# Patient Record
Sex: FEMALE | Race: WHITE | Employment: OTHER | URBAN - METROPOLITAN AREA
[De-identification: names, ages, dates, MRNs, and addresses within clinical notes are randomized per-mention and may not be internally consistent; named-entity substitution may affect disease eponyms.]

---

## 2017-01-05 ENCOUNTER — OFFICE VISIT (OUTPATIENT)
Dept: DERMATOLOGY | Facility: AMBULATORY SURGERY CENTER | Age: 82
End: 2017-01-05

## 2017-01-05 VITALS
OXYGEN SATURATION: 96 % | SYSTOLIC BLOOD PRESSURE: 134 MMHG | HEIGHT: 60 IN | HEART RATE: 64 BPM | TEMPERATURE: 97.8 F | RESPIRATION RATE: 16 BRPM | DIASTOLIC BLOOD PRESSURE: 82 MMHG

## 2017-01-05 DIAGNOSIS — C44.212 BASAL CELL CARCINOMA OF POSTAURICULAR REGION, RIGHT: ICD-10-CM

## 2017-01-05 DIAGNOSIS — C44.311 BASAL CELL CARCINOMA OF RIGHT NASAL TIP: Primary | ICD-10-CM

## 2017-01-05 DIAGNOSIS — C44.311 BASAL CELL CARCINOMA OF RIGHT ALA NASI: ICD-10-CM

## 2017-01-05 RX ORDER — LIDOCAINE HYDROCHLORIDE AND EPINEPHRINE 10; 10 MG/ML; UG/ML
3 INJECTION, SOLUTION INFILTRATION; PERINEURAL ONCE
Qty: 1 VIAL | Refills: 0
Start: 2017-01-05 | End: 2017-01-05

## 2017-01-05 RX ORDER — GABAPENTIN 300 MG/1
CAPSULE ORAL
Refills: 5 | COMMUNITY
Start: 2016-11-04 | End: 2018-02-23

## 2017-01-05 RX ORDER — BUPIVACAINE HYDROCHLORIDE AND EPINEPHRINE 2.5; 5 MG/ML; UG/ML
INJECTION, SOLUTION INFILTRATION; PERINEURAL
Qty: 1.5 ML | Refills: 0
Start: 2017-01-05 | End: 2018-02-23

## 2017-01-05 RX ORDER — FUROSEMIDE 20 MG/1
TABLET ORAL
Refills: 5 | COMMUNITY
Start: 2016-12-26

## 2017-01-05 NOTE — PATIENT INSTRUCTIONS
WOUND CARE INSTRUCTIONS    1. Keep the dressing clean and dry and do not remove for 48 hours. 2. Then change the dressing once a day as follows:  a. Wash hands before and after each dressing change. b. Remove dressing and wash site gently with mild soap and water, rinse, and pat dry.  c. Apply an ointment (Bacitracin, Polysporin, Neosporin, Petroleum jelly or Aquaphor). d. Apply a non-stick (Telfa) dressing or Band-Aid to cover the wound. Remove pressure bandage Saturday, then wash gently and apply Vaseline and a band-aid to site daily for 1 week. 3. Watch for:  BLEEDING: A small amount of drainage may occur. If bleeding occurs, elevate and rest the surgery site. Apply gauze and steady pressure for 15 minutes. If bleeding continues, call this office. INFECTION: Signs of infection include increased redness, pain, warmth, drainage of pus, and fever. If this occurs, call this office. 4. Special Instructions (follow any that are checked):  · [] You have stitches that need to be removed in 0 days  · [x] Avoid bending at the waist and heavy lifting for two days. · [x] Sleep with your head elevated for the next two nights. · [x] Rest the surgery site and keep it elevated as much as possible for two days. · [x] You may apply an ice-pack for 10-15 minutes every waking hour for the rest of the day. · [] Eat a soft diet and avoid hot food and hot drinks for the rest of the day. · [] Other instructions: Follow up as needed  Take Tylenol for pain as needed. Once the site is healed with no remaining bandages or open areas, protect your surgical site and scar from the sun, as this area will be more sensitive. Use a broad spectrum sunscreen SPF 30 or higher daily, and a chemical free product (one containing zinc oxide or titanium dioxide) is a good choice if the area is sensitive. You may begin to gently massage the surgical site in 2-3 weeks, rubbing in a circular motion along the scar.  This can help reduce swelling and thickness of a scar. A scar cream may be used beginnning 1 month after the surgery. If you have any questions or concerns, please call our office Monday through Friday at [phone].

## 2017-01-05 NOTE — PROGRESS NOTES
This note is written by Rosie Cooper, as dictated by Sj Díaz. Joao Stein MD.    CC: Basal cell carcinomas on the right ala, right nasal supratip, and right postauricular    History of present illness:     Aruna Mclean is a 80 y.o. female referred by Darren Lucas DNP. She has three biopsy-proven basal cell carcinomas on the right ala, right nasal supratrip, and right postauricular. The lesions on her right ala and right postauricular are new basal cell carcinomas present for a few months, no prior treatment. The lesion behind her ear is described as a persistent bump that waxes and wanes in size and itches. The lesion on her nasal supratip is recurrent from surgical treatment and was treated with cryotherapy a few months ago. Biopsies confirmed the diagnoses of basal cell carcinoma for all three lesions, and I reviewed the written pathology. She also has an actinic keratosis on her nasal tip. She is feeling well and in her usual state of health today. She has no pain, no current illnesses, no other skin concerns. Her allergies, medications, medical, and social history are reviewed by me today. Exam:     She is an awake, alert, and oriented 80 y.o. female who appears well and in no distress. There is no preauricular, submandibular, or cervical lymphadenopathy. I examined her face and neck. She has a 3 x 2 mm pearly papule on her right ala, an 8 x 6 mm shiny papule and scar on her right nasal supratip with thin AK near the tip, and a 6 x 4 mm pearly papule on her right postauricular. Locations confirmed by patient and biopsy photos. Assessment/plan:    1. Basal cell carcinoma, right ala. I discussed the diagnosis of basal cell carcinoma and summarized the pathology report. Mohs surgery is indicated by site. The procedure was discussed, written and verbal consent were obtained. I performed the procedure. One stage was required to reach a tumor free plane.  The surgical defect was managed with second intent healing. There were no complications. She will follow up as needed as the site heals. Indications, risks, and options were discussed with Linh Castellanos preoperatively. Risks including, but not limited to: pain, bleeding, infection, tumor recurrence, scarring and damage to motor and/or sensory nerves, were discussed. Linh Castellanos chose Mohs surgery. Linh Castellanos was an acceptable surgery candidate. Linh Castellanos was placed in the appropriate position on the operating table in the Mohs surgery procedure room. The area was prepped and draped in the standard manner. Gentian violet was used to outline the clinical margins of the tumor. Local anesthesia was then obtained. The grossly visible tumor was then removed, an underlying layer was excised and mapped according to the Mohs technique, and the individual specimens examined microscopically. The process was repeated until microscopic examination of the tissue specimens confirmed a tumor-free plane. Hemostasis was obtained with electrosurgery and pressure. The wound was covered between stages with moist saline gauze. Wound care instructions (written and verbal) and a follow up appointment were given to Linh Castellanos before discharge. Linh Castellanos was discharged in good condition. 2.  Basal cell carcinoma, right nasal supratip. I discussed the diagnosis of basal cell carcinoma and summarized the pathology report. Mohs surgery is indicated by site, size and recurrence. The procedure was discussed, written and verbal consent were obtained. I performed the procedure. One stage was required to reach a tumor free plane. The surgical defect was managed with complex repair which included excision of the clinical extent of the AK as well. There were no complications. She will follow up as needed as the site heals. Indications, risks, and options were discussed with Linh Castellanos preoperatively.  Risks including, but not limited to: pain, bleeding, infection, tumor recurrence, scarring and damage to motor and/or sensory nerves, were discussed. Jazzmine Nuno chose Mohs surgery. Jazzmine Nuno was an acceptable surgery candidate. Jazzmine Nuno was placed in the appropriate position on the operating table in the Mohs surgery procedure room. The area was prepped and draped in the standard manner. Gentian violet was used to outline the clinical margins of the tumor. Local anesthesia was then obtained. The grossly visible tumor was then removed, an underlying layer was excised and mapped according to the Mohs technique, and the individual specimens examined microscopically. The process was repeated until microscopic examination of the tissue specimens confirmed a tumor-free plane. Hemostasis was obtained with electrosurgery and pressure. The wound was covered between stages with moist saline gauze. Wound care instructions (written and verbal) and a follow up appointment were given to Jazzmine Palmertomasa before discharge. Jazzmine Nuno was discharged in good condition. The wound management options of second intent healing, layered closure, local flap, and/or full thickness skin graft were discussed. Jazzmine Nuno understands the aims, risks, alternatives, and possible complications and elects to proceed with a complex layered closure. Wound margins were made vertical, edges undermined in the muscular plane, standing cones corrected at both poles followed by layered closure. The wound was closed with buried 6-0 polysorb suture in the muscle and deep subcutis to reduce width of the wound, a second layer in the dermis to reduce tension on the skin edges, and skin edges were approximated with 6-0 fast gut suture. The final closure length was 32 mm. The wound was bandaged with Vaseline, Telfa, gauze and Coverroll. Wound care instructions (written and verbal) and a follow up appointment were given to Jazzmine Palmertomasa before discharge. Mevelyn Lipoma was discharged in good condition. 3. Basal cell carcinoma, right postauricular. Curettage was advised to address this lesion with malignant destruction. The procedure was reviewed and verbal and written consent were obtained. The risks of pain, bleeding, infection, scar, and recurrence of the lesion were discussed. I performed the procedure. The site was cleansed and anesthetized with 1% Lidocaine with Epinephrine 1:100,000. Curettage was performed by me to include a 2 mm margin, resulting in a post curettage defect size of 10 x 8 mm. Drysol was used for hemostasis. The wound was bandaged and care reviewed. The specimen was sent to pathology. I will contact the patient with the results and any further treatment that may be necessary. 4. History of nonmelanoma skin cancer. I discussed the diagnosis and recommend routine examinations with Jen Young DNP, for surveillance. The documentation recorded by the scribe accurately reflects the service I personally performed and the decisions made by me. [facility]   OFFICE PROCEDURE PROGRESS NOTE     Chart reviewed for the following:     Tammy Leon MD, have reviewed the History, Physical and updated the Allergic reactions for [address] performed immediately prior to start of procedure:     Tammy Leon MD, have performed the following reviews on Mevelyn Lipoma prior to the start of the procedure:     * Patient was identified by name and date of birth   * Agreement on procedure being performed was verified   * Risks and Benefits explained to the patient   * Procedure site verified and marked as necessary   * Patient was positioned for comfort   * Consent was signed and verified     Time: 10:00 AM  Date of procedure: 1/5/2017  Procedure performed by: Hedy Marc.  Nicole Leon MD   Provider assisted by: LPN   Patient assisted by: self   How tolerated by patient: tolerated the procedure well with no complications   Comments: none

## 2017-01-05 NOTE — PROGRESS NOTES
Pre-op: Patient present today for the evaluation of basal cell carcinoma to the right nasal supratip and right ala. Procedure explained with full understanding. Vitals:    01/05/17 0949   BP: 134/82   Pulse: 64   Resp: 16   Temp: 97.8 °F (36.6 °C)   TempSrc: Oral   SpO2: 96%   Height: 5' (1.524 m)     preoperatively, will continue to monitor. Post-op: Written and verbal post-op wound care instructions given to patient with full understanding of care. Surgical wound bandaged with Vaseline, Telfa, 2x2 gauze, and coverall tape. All questions and concerns addressed. Vitals stable postoperatively.

## 2017-01-05 NOTE — MR AVS SNAPSHOT
Visit Information Date & Time Provider Department Dept. Phone Encounter #  
 1/5/2017 10:00 AM MD Mando Avery [id number] [phone] [id number] Upcoming Health Maintenance Date Due  
 LIPID PANEL Q1 11/5/1928 FOOT EXAM Q1 11/5/1938 MICROALBUMIN Q1 11/5/1938 EYE EXAM RETINAL OR DILATED Q1 11/5/1938 ZOSTER VACCINE AGE 60> 11/5/1988 GLAUCOMA SCREENING Q2Y 11/5/1993 MEDICARE YEARLY EXAM 11/5/1993 DTaP/Tdap/Td series (1 - Tdap) 3/16/2012 HEMOGLOBIN A1C Q6M 5/13/2014 INFLUENZA AGE 9 TO ADULT 8/1/2016 Pneumococcal 65+ Low/Medium Risk (2 of 2 - PCV13) 9/22/2016 Allergies as of 1/5/2017  Review Complete On: 1/5/2017 By: Naida GRANADOS Rash, LPN Severity Noted Reaction Type Reactions Latex  11/13/2013    Itching Pcn [Penicillins] High 04/27/2010    Anaphylaxis Adhesive Tape-silicones  09/63/1265    Itching, Other (comments) REDNESS Codeine  04/27/2010    Nausea and Vomiting Current Immunizations  Reviewed on 9/21/2015 Name Date Pneumococcal Polysaccharide (PPSV-23) 9/22/2015 10:48 AM  
 TD Vaccine 3/15/2012 10:41 PM  
  
 Not reviewed this visit Vitals BP Pulse Temp Resp Height(growth percentile) SpO2  
 134/82 (BP 1 Location: Right arm, BP Patient Position: Sitting) 64 97.8 °F (36.6 °C) (Oral) 16 5' (1.524 m) 96% OB Status Smoking Status Postmenopausal Former Smoker Preferred Pharmacy Pharmacy Name Phone [facility] [address] [place] [address], [address] [place] [address] [phone] Your Updated Medication List  
  
   
This list is accurate as of: 1/5/17 10:49 AM.  Always use your most recent med list.  
  
  
  
  
 bumetanide 0.5 mg tablet Commonly known as:  Merla Goods Take 0.5 mg by mouth daily. furosemide 20 mg tablet Commonly known as:  LASIX TK 1 T PO QD  
  
 * gabapentin 100 mg capsule Commonly known as:  NEURONTIN Take 100 mg by mouth nightly. * gabapentin 300 mg capsule Commonly known as:  NEURONTIN  
TK ONE C PO  ONCE A DAY HYDROcodone-acetaminophen 5-325 mg per tablet Commonly known as:  [place] Cuff Take 1 Tab by mouth every four (4) hours as needed for Pain. Max Daily Amount: 6 Tabs. metoprolol tartrate 25 mg tablet Commonly known as:  LOPRESSOR Take 12.5 mg by mouth two (2) times a day. nystatin-triamcinolone topical cream  
Commonly known as:  MYCOLOG II Apply  to affected area as needed. (sacral wound) omeprazole 20 mg capsule Commonly known as:  PRILOSEC Take 20 mg by mouth nightly. 9 PM  
  
 ondansetron 4 mg disintegrating tablet Commonly known as:  ZOFRAN ODT Take 1 Tab by mouth every eight (8) hours as needed for Nausea. traMADol 50 mg tablet Commonly known as:  ULTRAM  
Take 50 mg by mouth every six (6) hours as needed for Pain. traZODone 50 mg tablet Commonly known as:  Emaline Sober Take 50 mg by mouth nightly. VITAMIN B-12 1,000 mcg tablet Generic drug:  cyanocobalamin Take 1,000 mcg by mouth daily. * Notice: This list has 2 medication(s) that are the same as other medications prescribed for you. Read the directions carefully, and ask your doctor or other care provider to review them with you. Patient Instructions WOUND CARE INSTRUCTIONS 1. Keep the dressing clean and dry and do not remove for 48 hours. 2. Then change the dressing once a day as follows: 
a. Wash hands before and after each dressing change. b. Remove dressing and wash site gently with mild soap and water, rinse, and pat dry. 
c. Apply an ointment (Bacitracin, Polysporin, Neosporin, Petroleum jelly or Aquaphor). d. Apply a non-stick (Telfa) dressing or Band-Aid to cover the wound. Remove pressure bandage Saturday, then wash gently and apply Vaseline and a band-aid to site daily for 1 week. 3. Watch for: BLEEDING: A small amount of drainage may occur. If bleeding occurs, elevate and rest the surgery site. Apply gauze and steady pressure for 15 minutes. If bleeding continues, call this office. INFECTION: Signs of infection include increased redness, pain, warmth, drainage of pus, and fever. If this occurs, call this office. 4. Special Instructions (follow any that are checked): ·  You have stitches that need to be removed in 0 days ·  Avoid bending at the waist and heavy lifting for two days. ·  Sleep with your head elevated for the next two nights. ·  Rest the surgery site and keep it elevated as much as possible for two days. ·  You may apply an ice-pack for 10-15 minutes every waking hour for the rest of the day. ·  Eat a soft diet and avoid hot food and hot drinks for the rest of the day. ·  Other instructions: Follow up as needed Take Tylenol for pain as needed. Once the site is healed with no remaining bandages or open areas, protect your surgical site and scar from the sun, as this area will be more sensitive. Use a broad spectrum sunscreen SPF 30 or higher daily, and a chemical free product (one containing zinc oxide or titanium dioxide) is a good choice if the area is sensitive. You may begin to gently massage the surgical site in 2-3 weeks, rubbing in a circular motion along the scar. This can help reduce swelling and thickness of a scar. A scar cream may be used beginnning 1 month after the surgery. If you have any questions or concerns, please call our office Monday through Friday at [phone]. Introducing [facility]! [facility] introduces [employer] patient portal. Now you can access parts of your medical record, email your doctor's office, and request medication refills online. 1. In your internet browser, go to [email or web address] 2. Click on the First Time User? Click Here link in the Sign In box.  You will see the New Member Sign Up page. 3. Enter your [employer] Access Code exactly as it appears below. You will not need to use this code after youve completed the sign-up process. If you do not sign up before the expiration date, you must request a new code. · [employer] Access Code: [email or web address] Expires: 3/14/2017  1:32 PM 
 
4. Enter the last four digits of your Social Security Number (xxxx) and Date of Birth (mm/dd/yyyy) as indicated and click Submit. You will be taken to the next sign-up page. 5. Create a [employer] ID. This will be your [employer] login ID and cannot be changed, so think of one that is secure and easy to remember. 6. Create a [employer] password. You can change your password at any time. 7. Enter your Password Reset Question and Answer. This can be used at a later time if you forget your password. 8. Enter your e-mail address. You will receive e-mail notification when new information is available in [address]. 9. Click Sign Up. You can now view and download portions of your medical record. 10. Click the Download Summary menu link to download a portable copy of your medical information. If you have questions, please visit the Frequently Asked Questions section of the [employer] website. Remember, [employer] is NOT to be used for urgent needs. For medical emergencies, dial 911. Now available from your iPhone and Android! Please provide this summary of care documentation to your next provider. Your primary care clinician is listed as Meka Mcneill. If you have any questions after today's visit, please call [phone].

## 2017-02-21 ENCOUNTER — OFFICE VISIT (OUTPATIENT)
Dept: SURGERY | Age: 82
End: 2017-02-21

## 2017-02-21 VITALS
BODY MASS INDEX: 27.68 KG/M2 | RESPIRATION RATE: 20 BRPM | TEMPERATURE: 98.6 F | HEART RATE: 67 BPM | SYSTOLIC BLOOD PRESSURE: 160 MMHG | DIASTOLIC BLOOD PRESSURE: 96 MMHG | WEIGHT: 141 LBS | OXYGEN SATURATION: 94 % | HEIGHT: 60 IN

## 2017-02-21 DIAGNOSIS — K21.9 GASTROESOPHAGEAL REFLUX DISEASE WITHOUT ESOPHAGITIS: ICD-10-CM

## 2017-02-21 DIAGNOSIS — R10.33 PERIUMBILICAL ABDOMINAL PAIN: Primary | ICD-10-CM

## 2017-02-21 NOTE — PROGRESS NOTES
1. Have you been to the ER, urgent care clinic since your last visit? Hospitalized since your last visit? No    2. Have you seen or consulted any other health care providers outside of the [address] [place] [address] since your last visit? Include any pap smears or colon screening.  No

## 2017-02-22 NOTE — PROGRESS NOTES
Subjective:      Isauro Thompson is a 80 y.o. female presents for postop care 4 months following umbilical hernia repair. Appetite is fair. Eating a regular diet with recurrent GERD symptoms after prior paraesophageal hernia repair. Bowel movements are regular. The patient is voiding without difficulty. She complains of umbilical pain with recurrent intermittent bulge. Objective:     Visit Vitals    BP (!) 160/96 (BP 1 Location: Left arm, BP Patient Position: Sitting)    Pulse 67    Temp 98.6 °F (37 °C)    Resp 20    Ht 5' (1.524 m)    Wt 141 lb (64 kg)    SpO2 94%    BMI 27.54 kg/m2       General:  alert, cooperative, no distress, appears stated age   Abdomen: soft, pain with palpation to umbilical area without obvious bulge   Incision:   well healed     Assessment:     Umbilical pain, subjective intermittent bulge. Recurrent GERD symptoms    Plan:     1. Continue current medications. 2. Abdominal wall ultrasound to assess for recurrent ventral or inguinal hernia. 3. UGI to assess for recurrent paraesophageal hernia.

## 2017-02-22 NOTE — PATIENT INSTRUCTIONS

## 2017-03-06 ENCOUNTER — HOSPITAL ENCOUNTER (OUTPATIENT)
Dept: ULTRASOUND IMAGING | Age: 82
Discharge: HOME | End: 2017-03-06
Attending: SURGERY
Payer: MEDICARE

## 2017-03-06 ENCOUNTER — HOSPITAL ENCOUNTER (OUTPATIENT)
Dept: GENERAL RADIOLOGY | Age: 82
Discharge: HOME | End: 2017-03-06
Attending: SURGERY
Payer: MEDICARE

## 2017-03-06 DIAGNOSIS — R10.33 PERIUMBILICAL ABDOMINAL PAIN: ICD-10-CM

## 2017-03-06 DIAGNOSIS — K21.9 GASTROESOPHAGEAL REFLUX DISEASE WITHOUT ESOPHAGITIS: ICD-10-CM

## 2017-03-06 PROCEDURE — 76705 ECHO EXAM OF ABDOMEN: CPT

## 2017-03-06 PROCEDURE — 74241 XR UPPER GI SERIES W KUB: CPT

## 2017-06-07 ENCOUNTER — OFFICE VISIT (OUTPATIENT)
Dept: DERMATOLOGY | Facility: AMBULATORY SURGERY CENTER | Age: 82
End: 2017-06-07

## 2017-06-07 VITALS
OXYGEN SATURATION: 97 % | TEMPERATURE: 98.2 F | WEIGHT: 141 LBS | HEIGHT: 60 IN | DIASTOLIC BLOOD PRESSURE: 74 MMHG | BODY MASS INDEX: 27.68 KG/M2 | SYSTOLIC BLOOD PRESSURE: 122 MMHG | HEART RATE: 72 BPM

## 2017-06-07 DIAGNOSIS — Z85.828 HISTORY OF SKIN CANCER: ICD-10-CM

## 2017-06-07 DIAGNOSIS — J34.89 LESION OF NOSE: Primary | ICD-10-CM

## 2017-06-07 NOTE — PROGRESS NOTES
Written by Brianna Metzger, as dictated by Brandyn Pollard, [address]. Name: Marta Aguilar       Age: 80 y.o. Date: 6/7/2017    Chief Complaint:   Chief Complaint   Patient presents with    Other     spots on the nose       Subjective:    HPI:  Ms.. Marta Aguilar is a 80 y.o. female who presents for the evaluation of a lesion on the left nasal sidewall. She states that the lesion appeared many months ago. The patient has not had prior treatment for this lesion. Associated symptoms include persistent swollen area without skin change. ROS: Consitutional: Negative  Dermatological : positive for - skin lesion changes      Social History     Social History    Marital status:      Spouse name: N/A    Number of children: N/A    Years of education: N/A     Occupational History    Not on file.      Social History Main Topics    Smoking status: Former Smoker     Packs/day: 0.25     Years: 45.00     Quit date: 11/13/1993    Smokeless tobacco: Never Used      Comment: 2-3 CIGARETTES PER DAY    Alcohol use Yes      Comment: RARELY    Drug use: No    Sexual activity: Not Currently     Other Topics Concern    Not on file     Social History Narrative       Family History   Problem Relation Age of Onset    Hypertension Mother     Diabetes Mother     Heart Failure Mother     Heart Disease Mother     Arthritis-osteo Mother     Stroke Father     Kidney Disease Father      DAMAGED KIDNEY AFTER A FALL    Arthritis-osteo Father     Cancer Sister      PANCREATIC AND LIVER    Anesth Problems Neg Hx        Past Medical History:   Diagnosis Date    Arthritis     Cancer ([address]     [address] ON F[address]    DDD (degenerative disc disease)     Diabetes mellitus type II 8/20/2010    NO MEDS CURRENTLY, PT DENIES (9/7/16)    Family history of skin cancer     GERD (gastroesophageal reflux disease) 4/27/2010    Heart murmur     History of blood transfusion [address], NO REACTION    Hyperlipidemia LDL goal < 100     Hyperlipidemia LDL goal < 100 4/27/2010    Hypertension     Impaired fasting glucose     Osteoporosis     Skin cancer     Numerous BCC on face and ears    Sun-damaged skin     Thyroid disease     NO MEDS CURRENTLY (9/7/16)       Past Surgical History:   Procedure Laterality Date    HX BACK SURGERY      SOME SURGERY ON LOWER BACK    HX BREAST BIOPSY      BENIGN, HAD FIBROCYST    HX CATARACT REMOVAL Bilateral     W/LENS IMPLANT    HX CHOLECYSTECTOMY      HX GI      ENDOSCOPY, STRETCHING OF ESOPHAGOUS    HX GI      COLONOSCOPY    HX HEART CATHETERIZATION      NO STENTS    HX HERNIA REPAIR  9/17/15    Laparoscopic paraesophageal hernia repair with mesh  by Dr Nitish Canela  95/34/9742    lap umbilical hernia repair-[facility]-Dr. DALLIN Abdi    HX KNEE REPLACEMENT Left 11/25/2013    HX LAP CHOLECYSTECTOMY      HX OTHER SURGICAL      BCCA ON FACE    HX UROLOGICAL      ? BLADDER TUCK    REMOVAL GALLBLADDER      TOTAL KNEE ARTHROPLASTY Right 1993       Current Outpatient Prescriptions   Medication Sig Dispense Refill    furosemide (LASIX) 20 mg tablet TK 1 T PO QD  5    gabapentin (NEURONTIN) 300 mg capsule TK ONE C PO  ONCE A DAY  5    cyanocobalamin (VITAMIN B-12) 1,000 mcg tablet Take 1,000 mcg by mouth daily.  bumetanide (BUMEX) 0.5 mg tablet Take 0.5 mg by mouth daily.  gabapentin (NEURONTIN) 100 mg capsule Take 100 mg by mouth nightly.  omeprazole (PRILOSEC) 20 mg capsule Take 20 mg by mouth nightly. 9 PM      traZODone (DESYREL) 50 mg tablet Take 50 mg by mouth nightly.  metoprolol (LOPRESSOR) 25 mg tablet Take 12.5 mg by mouth two (2) times a day.  nystatin-triamcinolone (MYCOLOG II) topical cream Apply  to affected area as needed. (sacral wound)       traMADol (ULTRAM) 50 mg tablet Take 50 mg by mouth every six (6) hours as needed for Pain.       bupivacaine-EPINEPHrine (MARCAINE-EPINEPHRINE) 0.25 %-1:200,000 soln Used for mohs surgery 1.5 mL 0    HYDROcodone-acetaminophen (NORCO) 5-325 mg per tablet Take 1 Tab by mouth every four (4) hours as needed for Pain. Max Daily Amount: 6 Tabs. 30 Tab 0    ondansetron (ZOFRAN ODT) 4 mg disintegrating tablet Take 1 Tab by mouth every eight (8) hours as needed for Nausea. 10 Tab 0       Allergies   Allergen Reactions    Latex Itching    Pcn [Penicillins] Anaphylaxis    Adhesive Tape-Silicones Itching and Other (comments)     REDNESS    Codeine Nausea and Vomiting         Objective:    Visit Vitals    /74 (BP 1 Location: Left arm, BP Patient Position: Sitting)    Pulse 72    Temp 98.2 °F (36.8 °C) (Oral)    Ht 5' (1.524 m)    Wt 141 lb (64 kg)    SpO2 97%    BMI 27.54 kg/m2       Galina Redd is a 80 y.o. female who appears well and in no distress. She is awake, alert, and oriented. A limited skin examination was completed including her face. There are well healed scars without evidence of lesion recurrence. There is a minimally elevated  mobile rubbery subcutaneous nodule on the left side of the nose without overlying skin change. This is just posterior / inferior to where her glasses rest.         Assessment/Plan:  1. Lesion left side of nose. I feel this is most likely fat and is accentuated by the place where her glasses rest. No skin change is appreciated on the surface for skin cancer. I suggest follow up with PCP and possible ENT evaluation if they see fit. 2.. Personal history of skin cancer. I discussed sun protection, sunscreen use, the warning signs of skin cancer, the need for self-skin examinations, and the need for regular practitioner exams every 1 year. The patient should follow up sooner as needed if new, changing, or symptomatic skin lesions arise. This plan was reviewed with the patient and patient agrees. All questions were answered.     This scribe documentation was reviewed by me and accurately reflects the examination and decisions made by me.

## 2018-02-23 ENCOUNTER — APPOINTMENT (OUTPATIENT)
Dept: ULTRASOUND IMAGING | Age: 83
End: 2018-02-23
Attending: EMERGENCY MEDICINE
Payer: MEDICARE

## 2018-02-23 ENCOUNTER — APPOINTMENT (OUTPATIENT)
Dept: CT IMAGING | Age: 83
End: 2018-02-23
Attending: EMERGENCY MEDICINE
Payer: MEDICARE

## 2018-02-23 ENCOUNTER — APPOINTMENT (OUTPATIENT)
Dept: GENERAL RADIOLOGY | Age: 83
End: 2018-02-23
Attending: EMERGENCY MEDICINE
Payer: MEDICARE

## 2018-02-23 ENCOUNTER — HOSPITAL ENCOUNTER (EMERGENCY)
Age: 83
Discharge: HOME | End: 2018-02-23
Attending: EMERGENCY MEDICINE
Payer: MEDICARE

## 2018-02-23 ENCOUNTER — TELEPHONE (OUTPATIENT)
Dept: SURGERY | Age: 83
End: 2018-02-23

## 2018-02-23 VITALS
BODY MASS INDEX: 26.5 KG/M2 | HEART RATE: 67 BPM | OXYGEN SATURATION: 94 % | WEIGHT: 135 LBS | HEIGHT: 60 IN | DIASTOLIC BLOOD PRESSURE: 85 MMHG | RESPIRATION RATE: 17 BRPM | SYSTOLIC BLOOD PRESSURE: 159 MMHG | TEMPERATURE: 98.6 F

## 2018-02-23 DIAGNOSIS — R11.2 NON-INTRACTABLE VOMITING WITH NAUSEA, UNSPECIFIED VOMITING TYPE: ICD-10-CM

## 2018-02-23 DIAGNOSIS — R10.13 ABDOMINAL PAIN, EPIGASTRIC: ICD-10-CM

## 2018-02-23 DIAGNOSIS — R79.89 ELEVATED LFTS: ICD-10-CM

## 2018-02-23 DIAGNOSIS — R19.7 DIARRHEA, UNSPECIFIED TYPE: ICD-10-CM

## 2018-02-23 DIAGNOSIS — K44.9 HIATAL HERNIA: Primary | ICD-10-CM

## 2018-02-23 LAB
ALBUMIN SERPL-MCNC: 3.7 G/DL (ref 3.5–5)
ALBUMIN/GLOB SERPL: 1.1 {RATIO} (ref 1.1–2.2)
ALP SERPL-CCNC: 215 U/L (ref 45–117)
ALT SERPL-CCNC: 137 U/L (ref 12–78)
ANION GAP SERPL CALC-SCNC: 6 MMOL/L (ref 5–15)
APPEARANCE UR: CLEAR
AST SERPL-CCNC: 80 U/L (ref 15–37)
BACTERIA URNS QL MICRO: NEGATIVE /HPF
BASOPHILS # BLD: 0 K/UL (ref 0–0.1)
BASOPHILS NFR BLD: 0 % (ref 0–1)
BILIRUB SERPL-MCNC: 0.5 MG/DL (ref 0.2–1)
BILIRUB UR QL: NEGATIVE
BUN SERPL-MCNC: 12 MG/DL (ref 6–20)
BUN/CREAT SERPL: 12 (ref 12–20)
CALCIUM SERPL-MCNC: 9.7 MG/DL (ref 8.5–10.1)
CHLORIDE SERPL-SCNC: 101 MMOL/L (ref 97–108)
CO2 SERPL-SCNC: 29 MMOL/L (ref 21–32)
COLOR UR: ABNORMAL
CREAT SERPL-MCNC: 1 MG/DL (ref 0.55–1.02)
DIFFERENTIAL METHOD BLD: NORMAL
EOSINOPHIL # BLD: 0.2 K/UL (ref 0–0.4)
EOSINOPHIL NFR BLD: 3 % (ref 0–7)
EPITH CASTS URNS QL MICRO: ABNORMAL /LPF
ERYTHROCYTE [DISTWIDTH] IN BLOOD BY AUTOMATED COUNT: 12.7 % (ref 11.5–14.5)
GLOBULIN SER CALC-MCNC: 3.3 G/DL (ref 2–4)
GLUCOSE SERPL-MCNC: 110 MG/DL (ref 65–100)
GLUCOSE UR STRIP.AUTO-MCNC: NEGATIVE MG/DL
HCT VFR BLD AUTO: 40.2 % (ref 35–47)
HGB BLD-MCNC: 13.8 G/DL (ref 11.5–16)
HGB UR QL STRIP: ABNORMAL
HYALINE CASTS URNS QL MICRO: ABNORMAL /LPF (ref 0–5)
IMM GRANULOCYTES # BLD: 0 K/UL (ref 0–0.04)
IMM GRANULOCYTES NFR BLD AUTO: 0 % (ref 0–0.5)
KETONES UR QL STRIP.AUTO: NEGATIVE MG/DL
LEUKOCYTE ESTERASE UR QL STRIP.AUTO: NEGATIVE
LIPASE SERPL-CCNC: 112 U/L (ref 73–393)
LYMPHOCYTES # BLD: 0.9 K/UL (ref 0.8–3.5)
LYMPHOCYTES NFR BLD: 17 % (ref 12–49)
MCH RBC QN AUTO: 31.4 PG (ref 26–34)
MCHC RBC AUTO-ENTMCNC: 34.3 G/DL (ref 30–36.5)
MCV RBC AUTO: 91.6 FL (ref 80–99)
MONOCYTES # BLD: 0.7 K/UL (ref 0–1)
MONOCYTES NFR BLD: 13 % (ref 5–13)
NEUTS SEG # BLD: 3.5 K/UL (ref 1.8–8)
NEUTS SEG NFR BLD: 66 % (ref 32–75)
NITRITE UR QL STRIP.AUTO: NEGATIVE
NRBC # BLD: 0 K/UL (ref 0–0.01)
NRBC BLD-RTO: 0 PER 100 WBC
PH UR STRIP: 6.5 [PH] (ref 5–8)
PLATELET # BLD AUTO: 151 K/UL (ref 150–400)
PMV BLD AUTO: 9.9 FL (ref 8.9–12.9)
POTASSIUM SERPL-SCNC: 4 MMOL/L (ref 3.5–5.1)
PROT SERPL-MCNC: 7 G/DL (ref 6.4–8.2)
PROT UR STRIP-MCNC: NEGATIVE MG/DL
RBC # BLD AUTO: 4.39 M/UL (ref 3.8–5.2)
RBC #/AREA URNS HPF: ABNORMAL /HPF (ref 0–5)
SODIUM SERPL-SCNC: 136 MMOL/L (ref 136–145)
SP GR UR REFRACTOMETRY: 1.01 (ref 1–1.03)
UA: UC IF INDICATED,UAUC: ABNORMAL
UROBILINOGEN UR QL STRIP.AUTO: 0.2 EU/DL (ref 0.2–1)
WBC # BLD AUTO: 5.4 K/UL (ref 3.6–11)
WBC URNS QL MICRO: ABNORMAL /HPF (ref 0–4)

## 2018-02-23 PROCEDURE — 74011636320 HC RX REV CODE- 636/320: Performed by: EMERGENCY MEDICINE

## 2018-02-23 PROCEDURE — 85025 COMPLETE CBC W/AUTO DIFF WBC: CPT | Performed by: EMERGENCY MEDICINE

## 2018-02-23 PROCEDURE — 74011250636 HC RX REV CODE- 250/636: Performed by: EMERGENCY MEDICINE

## 2018-02-23 PROCEDURE — 74177 CT ABD & PELVIS W/CONTRAST: CPT

## 2018-02-23 PROCEDURE — 80053 COMPREHEN METABOLIC PANEL: CPT | Performed by: EMERGENCY MEDICINE

## 2018-02-23 PROCEDURE — 76705 ECHO EXAM OF ABDOMEN: CPT

## 2018-02-23 PROCEDURE — 99285 EMERGENCY DEPT VISIT HI MDM: CPT

## 2018-02-23 PROCEDURE — 96361 HYDRATE IV INFUSION ADD-ON: CPT

## 2018-02-23 PROCEDURE — 81001 URINALYSIS AUTO W/SCOPE: CPT | Performed by: EMERGENCY MEDICINE

## 2018-02-23 PROCEDURE — 83690 ASSAY OF LIPASE: CPT | Performed by: EMERGENCY MEDICINE

## 2018-02-23 PROCEDURE — 74011000258 HC RX REV CODE- 258: Performed by: EMERGENCY MEDICINE

## 2018-02-23 PROCEDURE — 96360 HYDRATION IV INFUSION INIT: CPT

## 2018-02-23 PROCEDURE — 74241 XR UPPER GI SERIES W KUB: CPT

## 2018-02-23 RX ORDER — SODIUM CHLORIDE 0.9 % (FLUSH) 0.9 %
10 SYRINGE (ML) INJECTION
Status: COMPLETED | OUTPATIENT
Start: 2018-02-23 | End: 2018-02-23

## 2018-02-23 RX ORDER — IBUPROFEN 200 MG
200 TABLET ORAL DAILY
COMMUNITY
End: 2018-03-06

## 2018-02-23 RX ORDER — ACETAMINOPHEN 500 MG
1000 TABLET ORAL
COMMUNITY

## 2018-02-23 RX ORDER — SODIUM CHLORIDE 9 MG/ML
100 INJECTION, SOLUTION INTRAVENOUS CONTINUOUS
Status: DISCONTINUED | OUTPATIENT
Start: 2018-02-23 | End: 2018-02-23 | Stop reason: HOSPADM

## 2018-02-23 RX ADMIN — SODIUM CHLORIDE 100 ML: 900 INJECTION, SOLUTION INTRAVENOUS at 13:31

## 2018-02-23 RX ADMIN — IOPAMIDOL 100 ML: 755 INJECTION, SOLUTION INTRAVENOUS at 13:31

## 2018-02-23 RX ADMIN — SODIUM CHLORIDE 500 ML: 900 INJECTION, SOLUTION INTRAVENOUS at 11:24

## 2018-02-23 RX ADMIN — SODIUM CHLORIDE 500 ML: 900 INJECTION, SOLUTION INTRAVENOUS at 11:26

## 2018-02-23 RX ADMIN — Medication 10 ML: at 13:31

## 2018-02-23 RX ADMIN — SODIUM CHLORIDE 100 ML/HR: 900 INJECTION, SOLUTION INTRAVENOUS at 12:00

## 2018-02-23 NOTE — ED TRIAGE NOTES
TRIAGE NOTE: Patient arrives via EMS with diffuse abdominal pain since Tuesday. +n/v/d. Worried about strictures from hernia? Patient with 1 episode of vomiting Tuesday and 3 episodes of diarrhea since Thursday.

## 2018-02-23 NOTE — CONSULTS
Surgery Consult    Subjective:      Surgical consultation was called on Jacob Ha who is a 80 y.o. female who complaints of abdominal pain, vomiting and diarrhea  She has had an umbilical hernia repair 99/5651 and a paraesophageal hernia repair 9/2015 by Dr. Janus Jeans. She states that she vomited 3 days ago and developed mild epigastric pain at the site of her hernia. She has been having diarrhea for the past 3 days 2-3 episodes a day. She has taken 6 tablets of imodium without relief. She took Advil this morning for pain and had some relief. She takes 2 Tylenol daily. Denies blood in her stool, fever or chills.            Past Medical History:   Diagnosis Date    Arthritis     Cancer ([facility] [address]     [facility] ON FACE    DDD (degenerative disc disease)     Diabetes mellitus type II 8/20/2010    NO MEDS CURRENTLY, PT DENIES (9/7/16)    Family history of skin cancer     GERD (gastroesophageal reflux disease) 4/27/2010    Heart murmur     History of blood transfusion [address], NO REACTION    Hyperlipidemia LDL goal < 100     Hyperlipidemia LDL goal < 100 4/27/2010    Hypertension     Impaired fasting glucose     Osteoporosis     Skin cancer     Numerous BCC on face and ears    Sun-damaged skin     Thyroid disease     NO MEDS CURRENTLY (9/7/16)     Past Surgical History:   Procedure Laterality Date    HX BACK SURGERY      SOME SURGERY ON LOWER BACK    HX BREAST BIOPSY      BENIGN, HAD FIBROCYST    HX CATARACT REMOVAL Bilateral     W/LENS IMPLANT    HX CHOLECYSTECTOMY      HX GI      ENDOSCOPY, STRETCHING OF ESOPHAGOUS    HX GI      COLONOSCOPY    HX HEART CATHETERIZATION      NO STENTS    HX HERNIA REPAIR  9/17/15    Laparoscopic paraesophageal hernia repair with mesh  by Dr Domonique Lin  38/86/6268    lap umbilical hernia repair-[facility]-Dr. Jessica Lazcano    HX KNEE REPLACEMENT Left 11/25/2013    HX LAP CHOLECYSTECTOMY      HX OTHER SURGICAL      BCCA ON FACE    HX UROLOGICAL      ? BLADDER TUCK    REMOVAL GALLBLADDER      TOTAL KNEE ARTHROPLASTY Right 1993      Social History     Social History    Marital status:      Spouse name: N/A    Number of children: N/A    Years of education: N/A     Social History Main Topics    Smoking status: Former Smoker     Packs/day: 0.25     Years: 45.00     Quit date: 11/13/1993    Smokeless tobacco: Never Used      Comment: 2-3 CIGARETTES PER DAY    Alcohol use Yes      Comment: RARELY    Drug use: No    Sexual activity: Not Currently     Other Topics Concern    None     Social History Narrative      Current Facility-Administered Medications   Medication Dose Route Frequency    0.9% sodium chloride infusion  100 mL/hr IntraVENous CONTINUOUS     Current Outpatient Prescriptions   Medication Sig    furosemide (LASIX) 20 mg tablet TK 1 T PO QD    gabapentin (NEURONTIN) 300 mg capsule TK ONE C PO  ONCE A DAY    bupivacaine-EPINEPHrine (MARCAINE-EPINEPHRINE) 0.25 %-1:200,000 soln Used for mohs surgery    HYDROcodone-acetaminophen (NORCO) 5-325 mg per tablet Take 1 Tab by mouth every four (4) hours as needed for Pain. Max Daily Amount: 6 Tabs.  ondansetron (ZOFRAN ODT) 4 mg disintegrating tablet Take 1 Tab by mouth every eight (8) hours as needed for Nausea.  cyanocobalamin (VITAMIN B-12) 1,000 mcg tablet Take 1,000 mcg by mouth daily.  bumetanide (BUMEX) 0.5 mg tablet Take 0.5 mg by mouth daily.  gabapentin (NEURONTIN) 100 mg capsule Take 100 mg by mouth nightly.  omeprazole (PRILOSEC) 20 mg capsule Take 20 mg by mouth nightly. 9 PM    traZODone (DESYREL) 50 mg tablet Take 50 mg by mouth nightly.  metoprolol (LOPRESSOR) 25 mg tablet Take 12.5 mg by mouth two (2) times a day.  nystatin-triamcinolone (MYCOLOG II) topical cream Apply  to affected area as needed. (sacral wound)     traMADol (ULTRAM) 50 mg tablet Take 50 mg by mouth every six (6) hours as needed for Pain.         Allergies   Allergen Reactions    Latex Itching    Pcn [Penicillins] Anaphylaxis    Adhesive Tape-Silicones Itching and Other (comments)     REDNESS    Codeine Nausea and Vomiting       Review of Systems:  Pertinent items are noted in the History of Present Illness. Objective:      Patient Vitals for the past 8 hrs:   BP Temp Pulse Resp SpO2 Height Weight   18 1230 179/71 - 67 17 98 % - -   18 1200 186/74 - 63 - 97 % - -   18 1130 184/71 - (!) 57 - 97 % - -   18 1116 (!) 192/95 98.2 °F (36.8 °C) (!) 59 17 98 % 5' (1.524 m) 135 lb (61.2 kg)   18 1115 (!) 192/95 - - - 98 % - -       Temp (24hrs), Av.2 °F (36.8 °C), Min:98.2 °F (36.8 °C), Max:98.2 °F (36.8 °C)      Physical Exam:   alert, cooperative, no distress  Cardiac exam:  normal S1 and S2                        Lungs: Normal chest wall and respirations. Clear to auscultation. Abdomen: soft, normal bowel sounds, non-tender            Labs:   CBC: Lab Results   Component Value Date/Time    WBC 5.4 2018 11:24 AM    RBC 4.39 2018 11:24 AM    HGB 13.8 2018 11:24 AM    HCT 40.2 2018 11:24 AM    PLATELET 371  11:24 AM     BMP: Lab Results   Component Value Date/Time    Glucose 110 (H) 2018 11:24 AM    Sodium 136 2018 11:24 AM    Potassium 4.0 2018 11:24 AM    Chloride 101 2018 11:24 AM    CO2 29 2018 11:24 AM    BUN 12 2018 11:24 AM    Creatinine 1.00 2018 11:24 AM    Calcium 9.7 2018 11:24 AM     CMP:  Lab Results   Component Value Date/Time    Glucose 110 (H) 2018 11:24 AM    Sodium 136 2018 11:24 AM    Potassium 4.0 2018 11:24 AM    Chloride 101 2018 11:24 AM    CO2 29 2018 11:24 AM    BUN 12 2018 11:24 AM    Creatinine 1.00 2018 11:24 AM    Calcium 9.7 2018 11:24 AM    Anion gap 6 2018 11:24 AM    BUN/Creatinine ratio 12 2018 11:24 AM    Alk.  phosphatase 215 (H) 2018 11:24 AM    Protein, total 7.0 02/23/2018 11:24 AM    Albumin 3.7 02/23/2018 11:24 AM    Globulin 3.3 02/23/2018 11:24 AM    A-G Ratio 1.1 02/23/2018 11:24 AM       Radiology review:   Abd/Pel Ct today    IMPRESSION  IMPRESSION:     1. Moderate paraesophageal type hiatal hernia is similar to 2016.  2. No bowel obstruction. 3. Colonic diverticulosis. No evidence of diverticulitis. 4. Unchanged chronic biliary dilatation is most likely the new normal for this  patient postcholecystectomy sincerely bilirubin is within normal limits. Abd US today  IMPRESSION:      Within normal limits postcholecystectomy. See recent CT report. Assessment:     80 y.o. female who presents with epigastric pain. Moderate Paraesophageal hernia       Plan:   D/w Dr. Paty Fernández. UGI  Consult GI-  Discharge home after UGI  May continue imodium for diarrhea. Follow up with Dr. Paty Fernández on Thursday in the office.  3/1/2018 @ 1:20 pm  Patient to remain on liquids and soft foods (puree foods)    Signed By: David Malone NP     February 23, 2018

## 2018-02-23 NOTE — CONSULTS
[address] [facility] [address]   [address] [facility] [address]        GASTROENTEROLOGY CONSULTATION NOTE  Will Eligio Bustamante  [phone] office  [phone] NP/PA in-hospital cell phone M-F until 4:30PM  After 5PM or on weekends, please call  for physician on call        NAME:  Elijah Flores   :   1928   MRN:   [id number]       Referring Physician: Dr. Julio Cesar Dasilva Date: 2018 3:59 PM    Chief Complaint: abdominal pain     History of Present Illness:  Patient is a 80 y.o. who is seen in consultation at the request of Dr. Flakito Jackson for abdominal pain, elevated lft's. Patient has a past medical history significant for hypertension, hyperlipidemia, arthritis, osteoporosis, cholecystectomy, and umbilical hernia repair. She presented to the ED with complaints of abdominal pain. Patient notes that she initially had an episode of vomiting 3 days ago followed by mild upper abdominal pain. Pain has been intermittent since that time. There are no clear aggravating or alleviating factors. No nausea, reflux, dysphagia, or odynphagia. There has been some associated watery diarrhea approximately 2-3 episodes daily. No melena or hematochezia. Patient took Imodium at home on Tuesday. Denies fevers or chills. Patient takes ibuprofen 200 mg daily. No anticoagulation. Patient takes omeprazole 20 mg once daily. Patient has a history of an umbilical hernia repair in 10/2016 and a paraesophageal hernia repair in 2015 by Dr. Jerri Kelsey. History of cholecystectomy. EGD and colonoscopy over 6 years ago while she lived in [place]. She reports a history of an esophageal dilation. I have reviewed the emergency room note, hospital admission note, notes by all other clinicians who have seen the patient during this hospitalization to date. I have reviewed the problem list and the reason for this hospitalization.  I have reviewed the allergies and the medications the patient was taking at home prior to this hospitalization. PMH:  Past Medical History:   Diagnosis Date    Arthritis     Cancer ([address]     [address] ON FACE    DDD (degenerative disc disease)     Diabetes mellitus type II 8/20/2010    NO MEDS CURRENTLY, PT DENIES (9/7/16)    Family history of skin cancer     GERD (gastroesophageal reflux disease) 4/27/2010    Heart murmur     History of blood transfusion [address], NO REACTION    Hyperlipidemia LDL goal < 100     Hyperlipidemia LDL goal < 100 4/27/2010    Hypertension     Impaired fasting glucose     Osteoporosis     Skin cancer     Numerous BCC on face and ears    Sun-damaged skin     Thyroid disease     NO MEDS CURRENTLY (9/7/16)       PSH:  Past Surgical History:   Procedure Laterality Date    HX BACK SURGERY      SOME SURGERY ON LOWER BACK    HX BREAST BIOPSY      BENIGN, HAD FIBROCYST    HX CATARACT REMOVAL Bilateral     W/LENS IMPLANT    HX CHOLECYSTECTOMY      HX GI      ENDOSCOPY, STRETCHING OF ESOPHAGOUS    HX GI      COLONOSCOPY    HX HEART CATHETERIZATION      NO STENTS    HX HERNIA REPAIR  9/17/15    Laparoscopic paraesophageal hernia repair with mesh  by Dr Babatunde Edmondson  21/10/5472    lap umbilical hernia repair-[facility]-Dr. DALLIN Abdi    HX KNEE REPLACEMENT Left 11/25/2013    HX LAP CHOLECYSTECTOMY      HX OTHER SURGICAL      BCCA ON FACE    HX UROLOGICAL      ? BLADDER TUCK    REMOVAL GALLBLADDER      TOTAL KNEE ARTHROPLASTY Right 1993       Allergies: Allergies   Allergen Reactions    Latex Itching    Pcn [Penicillins] Anaphylaxis    Adhesive Tape-Silicones Itching and Other (comments)     REDNESS    Codeine Nausea and Vomiting       Home Medications:  Prior to Admission Medications   Prescriptions Last Dose Informant Patient Reported? Taking?   acetaminophen (TYLENOL) 500 mg tablet   Yes Yes   Sig: Take 1,000 mg by mouth daily as needed for Pain.    cyanocobalamin (VITAMIN B-12) 1,000 mcg tablet Self Yes Yes   Sig: Take 1,000 mcg by mouth daily. furosemide (LASIX) 20 mg tablet   Yes Yes   Sig: TK 1 T PO QD   ibuprofen (MOTRIN) 200 mg tablet   Yes Yes   Sig: Take 200 mg by mouth daily. metoprolol (LOPRESSOR) 25 mg tablet  Self Yes Yes   Sig: Take 12.5 mg by mouth two (2) times a day. omeprazole (PRILOSEC) 20 mg capsule  Self Yes Yes   Sig: Take 20 mg by mouth nightly. 9 PM   traZODone (DESYREL) 50 mg tablet  Self Yes Yes   Sig: Take 50 mg by mouth nightly. Facility-Administered Medications: None       Hospital Medications:  Current Facility-Administered Medications   Medication Dose Route Frequency    0.9% sodium chloride infusion  100 mL/hr IntraVENous CONTINUOUS     Current Outpatient Prescriptions   Medication Sig    ibuprofen (MOTRIN) 200 mg tablet Take 200 mg by mouth daily.  acetaminophen (TYLENOL) 500 mg tablet Take 1,000 mg by mouth daily as needed for Pain.  furosemide (LASIX) 20 mg tablet TK 1 T PO QD    cyanocobalamin (VITAMIN B-12) 1,000 mcg tablet Take 1,000 mcg by mouth daily.  omeprazole (PRILOSEC) 20 mg capsule Take 20 mg by mouth nightly. 9 PM    traZODone (DESYREL) 50 mg tablet Take 50 mg by mouth nightly.  metoprolol (LOPRESSOR) 25 mg tablet Take 12.5 mg by mouth two (2) times a day.        Social History:  Social History   Substance Use Topics    Smoking status: Former Smoker     Packs/day: 0.25     Years: 45.00     Quit date: 11/13/1993    Smokeless tobacco: Never Used      Comment: 2-3 CIGARETTES PER DAY    Alcohol use Yes      Comment: RARELY       Family History:  Family History   Problem Relation Age of Onset    Hypertension Mother     Diabetes Mother     Heart Failure Mother     Heart Disease Mother     Arthritis-osteo Mother     Stroke Father     Kidney Disease Father      DAMAGED KIDNEY AFTER A FALL    Arthritis-osteo Father     Cancer Sister      PANCREATIC AND LIVER    Anesth Problems Neg Hx        Review of Systems:  Constitutional: negative fever, negative chills, negative weight loss  Eyes:   negative visual changes  ENT:   negative sore throat, tongue or lip swelling  Respiratory:  negative cough, negative dyspnea  Cards:  negative for chest pain, palpitations, lower extremity edema  GI:   See HPI  :  negative for frequency, dysuria  Integument:  negative for rash and pruritus  Heme:  negative for easy bruising and gum/nose bleeding  Musculoskeletal:negative for myalgias, back pain and muscle weakness  Neuro:    negative for headaches, dizziness, vertigo  Psych: negative for feelings of anxiety, depression     Objective:   Patient Vitals for the past 8 hrs:   BP Temp Pulse Resp SpO2 Height Weight   02/23/18 1230 179/71 - 67 17 98 % - -   02/23/18 1200 186/74 - 63 - 97 % - -   02/23/18 1130 184/71 - (!) 57 - 97 % - -   02/23/18 1116 (!) 192/95 98.2 °F (36.8 °C) (!) 59 17 98 % 5' (1.524 m) 61.2 kg (135 lb)   02/23/18 1115 (!) 192/95 - - - 98 % - -             EXAM:     CONST:  Pleasant female lying in bed, no acute distress   NEURO:  alert and oriented x 3   HEENT: EOMI, no scleral icterus   LUNGS: clear to ausculation bilaterally anteriorly   CARD:  regular rate and rhythm, S1 S2   ABD:  soft, no tenderness, no rebound, no guarding, bowel sounds (+) all 4 quadrants, no masses, non distended   EXT:  warm   PSYCH: full, not anxious     Data Review     Recent Labs      02/23/18   1124   WBC  5.4   HGB  13.8   HCT  40.2   PLT  151     Recent Labs      02/23/18   1124   NA  136   K  4.0   CL  101   CO2  29   BUN  12   CREA  1.00   GLU  110*   CA  9.7     Recent Labs      02/23/18   1124   SGOT  80*   AP  215*   TP  7.0   ALB  3.7   GLOB  3.3   LPSE  112     No results for input(s): INR, PTP, APTT in the last 72 hours. No lab exists for component: INREXT    EXAM:  CT ABD PELV W CONT     INDICATION: Diffuse abdominal pain for 3 days. Emesis 3 days ago. 3 episodes of  diarrhea over the past day. Previous hernia.   Cholecystectomy and hernia  repairs. Normal serum bilirubin. Normal white blood cell count.     COMPARISON: CT abdomen/pelvis on 3/14/2016 and 9/17/2015.     CONTRAST:  100 mL of Isovue-370.     TECHNIQUE:   Following the uneventful intravenous administration of contrast, thin axial  images were obtained through the abdomen and pelvis. Coronal and sagittal  reconstructions were generated. Oral contrast was not administered. CT dose  reduction was achieved through use of a standardized protocol tailored for this  examination and automatic exposure control for dose modulation. Adaptive  statistical iterative reconstruction (ASIR) was utilized.     FINDINGS:   LUNG BASES: No pneumonia. INCIDENTALLY IMAGED HEART AND MEDIASTINUM: Borderline cardiac size. Coronary  artery calcific atherosclerosis is partially imaged. Mitral valve calcifications  are partially imaged. Paraesophageal type hiatal hernia is similar to 2016. LIVER: Intrahepatic biliary dilatation is unchanged since 2016. No mass. Focal  fatty infiltration adjacent to the falciform ligament is unchanged. Smooth  surface. Normal size. GALLBLADDER: Resected. Dilated CBD to 12 mm is unchanged and likely normal for  this patient given the normal cervical bilirubin. SPLEEN: Normal size. No mass or infarct. PANCREAS: No mass or ductal dilatation. ADRENALS: Unremarkable. KIDNEYS: Left renal cyst is unchanged. No solid renal mass or hydronephrosis. STOMACH: Nondistended. SMALL BOWEL: No dilatation or wall thickening. COLON: Colonic diverticulosis. No inflammation. APPENDIX: Unremarkable. PERITONEUM: No ascites or pneumoperitoneum. RETROPERITONEUM: No lymphadenopathy or aortic aneurysm. REPRODUCTIVE ORGANS: Uterus is present. No adnexal mass. URINARY BLADDER: No mass or calculus. BONES: Posterior element lumbar spine hardware is unchanged. Bones are  osteopenic. No aggressive bone lesion. ADDITIONAL COMMENTS: No abdominal wall hernia.     IMPRESSION  IMPRESSION:     1. Moderate paraesophageal type hiatal hernia is similar to 2016.  2. No bowel obstruction. 3. Colonic diverticulosis. No evidence of diverticulitis. 4. Unchanged chronic biliary dilatation is most likely the new normal for this  patient postcholecystectomy sincerely bilirubin is within normal limits. Assessment:   · Epigastric abdominal pain: WBC normal, Hgb 13.8. CT abdomen/pelvis with IV contrast (2/23): moderate paraesophageal type hernia; no bowel obstruction; colonic diverticulosis, no evidence of diverticulosis; unchanged chronic biliary dilatation. · Elevated LFTs: AST: 80, ALT: 137, alkaline phosphatase: 215, total bilirubin: 0.5. Abdominal ultrasound (2/23): within normal limits post cholecystectomy; liver echogenicity is within normal limits; no focal liver lesion; biliary dilatation is unchanged, common bile duct measures 7 mm. Patient Active Problem List   Diagnosis Code    HTN (hypertension) I10    Hyperlipidemia LDL goal < 100 E78.5    DJD (degenerative joint disease) M19.90    DDD (degenerative disc disease) [id number]    GERD (gastroesophageal reflux disease) K21.9    Diabetes mellitus type II     Arthritis of knee, left M17.12    Incisional hernia B34.9    Periumbilical abdominal pain R10.33     Plan:     · Continue PPI  · UGI series results pending  · We will call the patient next week to make arrangements for outpatient EGD and/or office visit. · Thank you for allowing me to participate in care of Corwin Amandaica By: Angel Jewel, [place]     2/23/2018  3:59 PM         GI Attending: Agree with above. UGI series shows sliding hiatal hernia with esophageal dysmotility and presbyesophagus. No obstruction. Will plan on endoscopy as an outpatient in the near future. Will contact patient next week once we can confirm the date. Please call with any questions. Sohail Kirby MD

## 2018-02-23 NOTE — ED NOTES
I have reviewed discharge instructions with the patient. The patient verbalized understanding. Spoke with daughter in law regarding discharge, she will be here in approx 30 minutes to  patient.

## 2018-02-23 NOTE — DISCHARGE INSTRUCTIONS
AVOID TYLENOL. FOLLOWUP WITH GI FOR EGD SCOPE AND REGARDING LFT'S. EAT SOFT PUREED OR LIQUID DIET. Abdominal Pain: Care Instructions  Your Care Instructions    Abdominal pain has many possible causes. Some aren't serious and get better on their own in a few days. Others need more testing and treatment. If your pain continues or gets worse, you need to be rechecked and may need more tests to find out what is wrong. You may need surgery to correct the problem. Don't ignore new symptoms, such as fever, nausea and vomiting, urination problems, pain that gets worse, and dizziness. These may be signs of a more serious problem. Your doctor may have recommended a follow-up visit in the next 8 to 12 hours. If you are not getting better, you may need more tests or treatment. The doctor has checked you carefully, but problems can develop later. If you notice any problems or new symptoms, get medical treatment right away. Follow-up care is a key part of your treatment and safety. Be sure to make and go to all appointments, and call your doctor if you are having problems. It's also a good idea to know your test results and keep a list of the medicines you take. How can you care for yourself at home? · Rest until you feel better. · To prevent dehydration, drink plenty of fluids, enough so that your urine is light yellow or clear like water. Choose water and other caffeine-free clear liquids until you feel better. If you have kidney, heart, or liver disease and have to limit fluids, talk with your doctor before you increase the amount of fluids you drink. · If your stomach is upset, eat mild foods, such as rice, dry toast or crackers, bananas, and applesauce. Try eating several small meals instead of two or three large ones. · Wait until 48 hours after all symptoms have gone away before you have spicy foods, alcohol, and drinks that contain caffeine. · Do not eat foods that are high in fat.   · Avoid anti-inflammatory medicines such as aspirin, ibuprofen (Advil, Motrin), and naproxen (Aleve). These can cause stomach upset. Talk to your doctor if you take daily aspirin for another health problem. When should you call for help? Call 911 anytime you think you may need emergency care. For example, call if:  ? · You passed out (lost consciousness). ? · You pass maroon or very bloody stools. ? · You vomit blood or what looks like coffee grounds. ? · You have new, severe belly pain. ?Call your doctor now or seek immediate medical care if:  ? · Your pain gets worse, especially if it becomes focused in one area of your belly. ? · You have a new or higher fever. ? · Your stools are black and look like tar, or they have streaks of blood. ? · You have unexpected vaginal bleeding. ? · You have symptoms of a urinary tract infection. These may include:  ¨ Pain when you urinate. ¨ Urinating more often than usual.  ¨ Blood in your urine. ? · You are dizzy or lightheaded, or you feel like you may faint. ? Watch closely for changes in your health, and be sure to contact your doctor if:  ? · You are not getting better after 1 day (24 hours). Where can you learn more? Go to http://na-sarah.info/. Enter Z361 in the search box to learn more about \"Abdominal Pain: Care Instructions. \"  Current as of: March 20, 2017  Content Version: 11.4  © 7184-5696 [employer]. Care instructions adapted under license by [employer] (which disclaims liability or warranty for this information). If you have questions about a medical condition or this instruction, always ask your healthcare professional. [address] any warranty or liability for your use of this information. Nausea and Vomiting: Care Instructions  Your Care Instructions    When you are nauseated, you may feel weak and sweaty and notice a lot of saliva in your mouth.  Nausea often leads to vomiting. Most of the time you do not need to worry about nausea and vomiting, but they can be signs of other illnesses. Two common causes of nausea and vomiting are stomach flu and food poisoning. Nausea and vomiting from viral stomach flu will usually start to improve within 24 hours. Nausea and vomiting from food poisoning may last from 12 to 48 hours. The doctor has checked you carefully, but problems can develop later. If you notice any problems or new symptoms, get medical treatment right away. Follow-up care is a key part of your treatment and safety. Be sure to make and go to all appointments, and call your doctor if you are having problems. It's also a good idea to know your test results and keep a list of the medicines you take. How can you care for yourself at home? · To prevent dehydration, drink plenty of fluids, enough so that your urine is light yellow or clear like water. Choose water and other caffeine-free clear liquids until you feel better. If you have kidney, heart, or liver disease and have to limit fluids, talk with your doctor before you increase the amount of fluids you drink. · Rest in bed until you feel better. · When you are able to eat, try clear soups, mild foods, and liquids until all symptoms are gone for 12 to 48 hours. Other good choices include dry toast, crackers, cooked cereal, and gelatin dessert, such as Jell-O. When should you call for help? Call 911 anytime you think you may need emergency care. For example, call if:  ? · You passed out (lost consciousness). ?Call your doctor now or seek immediate medical care if:  ? · You have symptoms of dehydration, such as:  ¨ Dry eyes and a dry mouth. ¨ Passing only a little dark urine. ¨ Feeling thirstier than usual.   ? · You have new or worsening belly pain. ? · You have a new or higher fever. ? · You vomit blood or what looks like coffee grounds. ? Watch closely for changes in your health, and be sure to contact your doctor if:  ? · You have ongoing nausea and vomiting. ? · Your vomiting is getting worse. ? · Your vomiting lasts longer than 2 days. ? · You are not getting better as expected. Where can you learn more? Go to http://na-sarah.info/. Enter 25 [id number] in the search box to learn more about \"Nausea and Vomiting: Care Instructions. \"  Current as of: March 20, 2017  Content Version: 11.4  © 2709-4263 [employer]. Care instructions adapted under license by [employer] (which disclaims liability or warranty for this information). If you have questions about a medical condition or this instruction, always ask your healthcare professional. [place] [address] any warranty or liability for your use of this information. Hiatal Hernia: Care Instructions  Your Care Instructions  A hiatal hernia occurs when part of the stomach bulges into the chest cavity. A hiatal hernia may allow stomach acid and juices to back up into the esophagus (acid reflux). This can cause a feeling of burning, warmth, heat, or pain behind the breastbone. This feeling may often occur after you eat, soon after you lie down, or when you bend forward, and it may come and go. You also may have a sour taste in your mouth. These symptoms are commonly known as heartburn or reflux. But not all hiatal hernias cause symptoms. Follow-up care is a key part of your treatment and safety. Be sure to make and go to all appointments, and call your doctor if you are having problems. It's also a good idea to know your test results and keep a list of the medicines you take. How can you care for yourself at home? · Take your medicines exactly as prescribed. Call your doctor if you think you are having a problem with your medicine.   · Do not take aspirin or other nonsteroidal anti-inflammatory drugs (NSAIDs), such as ibuprofen (Advil, Motrin) or naproxen (Aleve), unless your doctor says it is okay. Ask your doctor what you can take for pain. · Your doctor may recommend over-the-counter medicine. For mild or occasional indigestion, antacids such as Tums, Gaviscon, Maalox, or Mylanta may help. Your doctor also may recommend over-the-counter acid reducers, such as famotidine (Pepcid AC), cimetidine (Tagamet HB), ranitidine (Zantac 75 and Zantac 150), or omeprazole (Prilosec). Read and follow all instructions on the label. If you use these medicines often, talk with your doctor. · Change your eating habits. ¨ It's best to eat several small meals instead of two or three large meals. ¨ After you eat, wait 2 to 3 hours before you lie down. Late-night snacks aren't a good idea. ¨ Chocolate, mint, and alcohol can make heartburn worse. They relax the valve between the esophagus and the stomach. ¨ Spicy foods, foods that have a lot of acid (like tomatoes and oranges), and coffee can make heartburn symptoms worse in some people. If your symptoms are worse after you eat a certain food, you may want to stop eating that food to see if your symptoms get better. · Do not smoke or chew tobacco.  · If you get heartburn at night, raise the head of your bed 6 to 8 inches by putting the frame on blocks or placing a foam wedge under the head of your mattress. (Adding extra pillows does not work.)  · Do not wear tight clothing around your middle. · Lose weight if you need to. Losing just 5 to 10 pounds can help. When should you call for help? Call your doctor now or seek immediate medical care if:  ? · You have new or worse belly pain. ? · You are vomiting. ? Watch closely for changes in your health, and be sure to contact your doctor if:  ? · You have new or worse symptoms of indigestion. ? · You have trouble or pain swallowing. ? · You are losing weight. ? · You do not get better as expected. Where can you learn more? Go to http://na-sarah.info/.   Enter N182 in the search box to learn more about \"Hiatal Hernia: Care Instructions. \"  Current as of: May 12, 2017  Content Version: 11.4  © 4472-4070 [employer]. Care instructions adapted under license by [employer] (which disclaims liability or warranty for this information). If you have questions about a medical condition or this instruction, always ask your healthcare professional. [address] any warranty or liability for your use of this information. Diarrhea: Care Instructions  Your Care Instructions    Diarrhea is loose, watery stools (bowel movements). The exact cause is often hard to find. Sometimes diarrhea is your body's way of getting rid of what caused an upset stomach. Viruses, food poisoning, and many medicines can cause diarrhea. Some people get diarrhea in response to emotional stress, anxiety, or certain foods. Almost everyone has diarrhea now and then. It usually isn't serious, and your stools will return to normal soon. The important thing to do is replace the fluids you have lost, so you can prevent dehydration. The doctor has checked you carefully, but problems can develop later. If you notice any problems or new symptoms, get medical treatment right away. Follow-up care is a key part of your treatment and safety. Be sure to make and go to all appointments, and call your doctor if you are having problems. It's also a good idea to know your test results and keep a list of the medicines you take. How can you care for yourself at home? · Watch for signs of dehydration, which means your body has lost too much water. Dehydration is a serious condition and should be treated right away. Signs of dehydration are:  ¨ Increasing thirst and dry eyes and mouth. ¨ Feeling faint or lightheaded. ¨ Darker urine, and a smaller amount of urine than normal.  · To prevent dehydration, drink plenty of fluids, enough so that your urine is light yellow or clear like water.  Choose water and other caffeine-free clear liquids until you feel better. If you have kidney, heart, or liver disease and have to limit fluids, talk with your doctor before you increase the amount of fluids you drink. · Begin eating small amounts of mild foods the next day, if you feel like it. ¨ Try yogurt that has live cultures of Lactobacillus. (Check the label.)  ¨ Avoid spicy foods, fruits, alcohol, and caffeine until 48 hours after all symptoms are gone. ¨ Avoid chewing gum that contains sorbitol. ¨ Avoid dairy products (except for yogurt with Lactobacillus) while you have diarrhea and for 3 days after symptoms are gone. · The doctor may recommend that you take over-the-counter medicine, such as loperamide (Imodium), if you still have diarrhea after 6 hours. Read and follow all instructions on the label. Do not use this medicine if you have bloody diarrhea, a high fever, or other signs of serious illness. Call your doctor if you think you are having a problem with your medicine. When should you call for help? Call 911 anytime you think you may need emergency care. For example, call if:  ? · You passed out (lost consciousness). ? · Your stools are maroon or very bloody. ?Call your doctor now or seek immediate medical care if:  ? · You are dizzy or lightheaded, or you feel like you may faint. ? · Your stools are black and look like tar, or they have streaks of blood. ? · You have new or worse belly pain. ? · You have symptoms of dehydration, such as:  ¨ Dry eyes and a dry mouth. ¨ Passing only a little dark urine. ¨ Feeling thirstier than usual.   ? · You have a new or higher fever. ? Watch closely for changes in your health, and be sure to contact your doctor if:  ? · Your diarrhea is getting worse. ? · You see pus in the diarrhea. ? · You are not getting better after 2 days (48 hours). Where can you learn more? Go to http://na-sarah.info/.   Enter L017 in the search box to learn more about \"Diarrhea: Care Instructions. \"  Current as of: March 20, 2017  Content Version: 11.4  © 6260-9002 [employer]. Care instructions adapted under license by [employer] (which disclaims liability or warranty for this information). If you have questions about a medical condition or this instruction, always ask your healthcare professional. [address] any warranty or liability for your use of this information.

## 2018-02-23 NOTE — TELEPHONE ENCOUNTER
I called the patient and she said she has been having diarrhea since Tuesday and she is afraid she may have a blockage she said she took 6 Imodium as well. She said she had terrible apin around her waist area, she said she vomited a large amount and then she said she felt better. Wednesday she said she had diarrhea and she is not eating, she said this morning diarrhea woke her up this morning and her pain is off and on. I spoke to [facility], NP and she said she needs to come to the ED. Patient said she was expectiong that and will come on. Pt in agreement.

## 2018-02-23 NOTE — ED PROVIDER NOTES
HPI Comments: 80 y.o. female with extensive past medical history, please see list, significant for arthritis, osteoporosis, hypertension, GERD, hyperlipidemia, gallbladder removal, cholecystectomy, and umbilical hernia repair who presents from home via EMS with chief complaint of abdominal pain. Patient notes she initially had an episode of vomiting 3 days ago and developed mild epigastric abdominal pain near the site of her hernia shortly afterward. Patient denies any aggravation upon palpation, but she mentions the pain has persisted over the last few days. Patient mentions accompanying diarrhea with approximately 2-3 episodes daily without blood. Patient reports taking 6 tablets of imodium at home for her diarrhea with minimal relief. She also took Advil this morning for her pain with some relief. Patient denies any exposure to TB or any recent traveling. Patient denies blood in stool, fever, and difficulty urinating. There are no other acute medical concerns at this time. Old Chart Review: Per note, patient had a laparoscopic umbilical hernia repair by Dr. Paty Fernández on 10/10/16. Social hx: Patient lives in independent living. Tobacco Use: No (former smoker), Alcohol Use: No, Drug Use: No    PCP: Urvashi Quintero MD  General Surgeon: Beti Reilly MD    Note written by Kirstie Etienne, as dictated by Carlos Akers MD 11:20 AM      The history is provided by the patient, the EMS personnel and medical records.         Past Medical History:   Diagnosis Date    Arthritis     Cancer ([facility] [address]     [facility] ON FACE    DDD (degenerative disc disease)     Diabetes mellitus type II 8/20/2010    NO MEDS CURRENTLY, PT DENIES (9/7/16)    Family history of skin cancer     GERD (gastroesophageal reflux disease) 4/27/2010    Heart murmur     History of blood transfusion [address] REACTION    Hyperlipidemia LDL goal < 100     Hyperlipidemia LDL goal < 100 4/27/2010    Hypertension     Impaired fasting glucose     Osteoporosis     Skin cancer     Numerous BCC on face and ears    Sun-damaged skin     Thyroid disease     NO MEDS CURRENTLY (9/7/16)       Past Surgical History:   Procedure Laterality Date    HX BACK SURGERY      SOME SURGERY ON LOWER BACK    HX BREAST BIOPSY      BENIGN, HAD FIBROCYST    HX CATARACT REMOVAL Bilateral     W/LENS IMPLANT    HX CHOLECYSTECTOMY      HX GI      ENDOSCOPY, STRETCHING OF ESOPHAGOUS    HX GI      COLONOSCOPY    HX HEART CATHETERIZATION      NO STENTS    HX HERNIA REPAIR  9/17/15    Laparoscopic paraesophageal hernia repair with mesh  by Dr Judith Kyle  93/13/3912    lap umbilical hernia repair-[facility]-Dr. DALLIN Abdi    HX KNEE REPLACEMENT Left 11/25/2013    HX LAP CHOLECYSTECTOMY      HX OTHER SURGICAL      BCCA ON FACE    HX UROLOGICAL      ? BLADDER TUCK    REMOVAL GALLBLADDER      TOTAL KNEE ARTHROPLASTY Right 1993         Family History:   Problem Relation Age of Onset    Hypertension Mother     Diabetes Mother     Heart Failure Mother     Heart Disease Mother     Arthritis-osteo Mother     Stroke Father     Kidney Disease Father      DAMAGED KIDNEY AFTER A FALL    Arthritis-osteo Father     Cancer Sister      PANCREATIC AND LIVER    Anesth Problems Neg Hx        Social History     Social History    Marital status:      Spouse name: N/A    Number of children: N/A    Years of education: N/A     Occupational History    Not on file. Social History Main Topics    Smoking status: Former Smoker     Packs/day: 0.25     Years: 45.00     Quit date: 11/13/1993    Smokeless tobacco: Never Used      Comment: 2-3 CIGARETTES PER DAY    Alcohol use Yes      Comment: RARELY    Drug use: No    Sexual activity: Not Currently     Other Topics Concern    Not on file     Social History Narrative         ALLERGIES: Latex; Pcn [penicillins];  Adhesive tape-silicones; and Codeine    Review of Systems Constitutional: Negative for chills and fever. Gastrointestinal: Positive for abdominal pain, diarrhea, nausea and vomiting. Negative for blood in stool. Genitourinary: Negative for difficulty urinating, dysuria, hematuria and urgency. All other systems reviewed and are negative. Vitals:    02/23/18 1116   BP: (!) 192/95   Pulse: (!) 59   Resp: 17   SpO2: 98%   Height: 5' (1.524 m)            Physical Exam     Nursing note and vitals reviewed. Constitutional: appears well-developed and well-nourished. No distress. ELDERLY. HENT:   Head: Normocephalic and atraumatic. Sclera anicteric  Nose: No rhinorrhea  Mouth/Throat: Oropharynx is clear and moist. Pharynx normal  Eyes: Conjunctivae are normal. Pupils are equal, round, and reactive to light. Right eye exhibits no discharge. Left eye exhibits no discharge. No scleral icterus. Neck: Painless normal range of motion. Supple  Cardiovascular: Normal rate, regular rhythm, normal heart sounds and intact distal pulses. Exam reveals no gallop and no friction rub. No murmur heard. Pulmonary/Chest: Effort normal and breath sounds normal. No respiratory distress. no wheezes. no rales. Abdominal: Soft. Bowel sounds are DECREASED. MILD EPIGASTRIC TENDERNESS. Musculoskeletal: Normal range of motion. no tenderness. No edema  Lymphadenopathy:   No cervical adenopathy. Neurological:  Alert and oriented to person, place, and time. Coordination normal. CN 2-12 intact. Moving all extremities. Skin: Skin is warm and dry. No rash noted. No pallor. MDM  77-year-old female with a prior history of hernia repair now with abdominal pain, nausea followed by vomiting. She does appear mildly dehydrated. Mild abdominal tenderness. Differential diagnosis includes pancreatitis, partial bowel obstruction, hepatitis, colitis and others. Check CT abdomen pelvis, lab work and give IV fluids.     ED Course       Procedures    CONSULT NOTE:  2:45 PM Ton Calles Diana Wade MD spoke with Kristin Dodson MD, Consult for Surgery. Discussed available diagnostic tests and clinical findings. Dr. Chandni Ching recommends upper GI and will have surgical PA/NP see the patient in the ED.     3:03 PM  Kim Morales from surgery is here to see the patient. Also updated the patient and family regarding the plan.     3:09 PM  Surgery would like the patient to have an upper GI in the ED and suggests starting the patient on soft puree diet. They also would like GI to see the patient in the ED. They would like the patient to follow up with Dr. Armando Hoover from GI since the patient does not have a current gastroenterologist.     CONSULT NOTE:  3:48 PM Diana Dubon MD spoke with Dr. Sotero Albarran, Consult for Gastroenterology. Discussed available diagnostic tests and clinical findings. Dr. Sotero Albarran will evaluate the patient in the ED and work towards an outpatient EGD.    5:24 PM  GI will call the patient in the morning to arrange for EGD. 5:35 PM  Patient's results have been reviewed with them. Patient and/or family have verbally conveyed their understanding and agreement of the patient's signs, symptoms, diagnosis, treatment and prognosis and additionally agree to follow up as recommended or return to the Emergency Room should their condition change prior to follow-up. Discharge instructions have also been provided to the patient with some educational information regarding their diagnosis as well a list of reasons why they would want to return to the ER prior to their follow-up appointment should their condition change.     Recent Results (from the past 24 hour(s))   CBC WITH AUTOMATED DIFF    Collection Time: 02/23/18 11:24 AM   Result Value Ref Range    WBC 5.4 3.6 - 11.0 K/uL    RBC 4.39 3.80 - 5.20 M/uL    HGB 13.8 11.5 - 16.0 g/dL    HCT 40.2 35.0 - 47.0 %    MCV 91.6 80.0 - 99.0 FL    MCH 31.4 26.0 - 34.0 PG    MCHC 34.3 30.0 - 36.5 g/dL    RDW 12.7 11.5 - 14.5 %    PLATELET 689 150 - 400 K/uL    MPV 9.9 8.9 - 12.9 FL    NRBC 0.0 0  WBC    ABSOLUTE NRBC 0.00 0.00 - 0.01 K/uL    NEUTROPHILS 66 32 - 75 %    LYMPHOCYTES 17 12 - 49 %    MONOCYTES 13 5 - 13 %    EOSINOPHILS 3 0 - 7 %    BASOPHILS 0 0 - 1 %    IMMATURE GRANULOCYTES 0 0.0 - 0.5 %    ABS. NEUTROPHILS 3.5 1.8 - 8.0 K/UL    ABS. LYMPHOCYTES 0.9 0.8 - 3.5 K/UL    ABS. MONOCYTES 0.7 0.0 - 1.0 K/UL    ABS. EOSINOPHILS 0.2 0.0 - 0.4 K/UL    ABS. BASOPHILS 0.0 0.0 - 0.1 K/UL    ABS. IMM. GRANS. 0.0 0.00 - 0.04 K/UL    DF AUTOMATED     LIPASE    Collection Time: 02/23/18 11:24 AM   Result Value Ref Range    Lipase 112 73 - 887 U/L   METABOLIC PANEL, COMPREHENSIVE    Collection Time: 02/23/18 11:24 AM   Result Value Ref Range    Sodium 136 136 - 145 mmol/L    Potassium 4.0 3.5 - 5.1 mmol/L    Chloride 101 97 - 108 mmol/L    CO2 29 21 - 32 mmol/L    Anion gap 6 5 - 15 mmol/L    Glucose 110 (H) 65 - 100 mg/dL    BUN 12 6 - 20 MG/DL    Creatinine 1.00 0.55 - 1.02 MG/DL    BUN/Creatinine ratio 12 12 - 20      GFR est AA >60 >60 ml/min/1.73m2    GFR est non-AA 52 (L) >60 ml/min/1.73m2    Calcium 9.7 8.5 - 10.1 MG/DL    Bilirubin, total 0.5 0.2 - 1.0 MG/DL    ALT (SGPT) 137 (H) 12 - 78 U/L    AST (SGOT) 80 (H) 15 - 37 U/L    Alk.  phosphatase 215 (H) 45 - 117 U/L    Protein, total 7.0 6.4 - 8.2 g/dL    Albumin 3.7 3.5 - 5.0 g/dL    Globulin 3.3 2.0 - 4.0 g/dL    A-G Ratio 1.1 1.1 - 2.2     URINALYSIS W/ REFLEX CULTURE    Collection Time: 02/23/18 12:37 PM   Result Value Ref Range    Color YELLOW/STRAW      Appearance CLEAR CLEAR      Specific gravity 1.006 1.003 - 1.030      pH (UA) 6.5 5.0 - 8.0      Protein NEGATIVE  NEG mg/dL    Glucose NEGATIVE  NEG mg/dL    Ketone NEGATIVE  NEG mg/dL    Bilirubin NEGATIVE  NEG      Blood SMALL (A) NEG      Urobilinogen 0.2 0.2 - 1.0 EU/dL    Nitrites NEGATIVE  NEG      Leukocyte Esterase NEGATIVE  NEG      WBC 0-4 0 - 4 /hpf    RBC 0-5 0 - 5 /hpf    Epithelial cells FEW FEW /lpf    Bacteria NEGATIVE NEG /hpf    UA:UC IF INDICATED CULTURE NOT INDICATED BY UA RESULT CNI      Hyaline cast 2-5 0 - 5 /lpf       Xr Upper Gi Series W Kub    Result Date: 2/23/2018  EXAM:  XR UPPER GI SERIES W KUB INDICATION: Abdominal pain, paraesophageal hernia. COMPARISON: None. Fluoroscopy dose (air kerma):  60.841 mGy. FINDINGS: The preliminary radiograph of the abdomen demonstrates a nonobstructive gas pattern. There are clips in the right upper quadrant. There is dextroconvex scoliosis of the spine with hardware in the spine at L4-L5. A single-contrast examination was performed. The patient ingested barium without difficulty. The esophagus is normal in caliber and contour with no mass, constricting lesion or mucosal abnormality. The esophageal motility is abnormal with tertiary wave activity and slow and incomplete clearing of the esophagus. There is a hiatal hernia. The stomach is normal in size, shape and position with no mass, ulcer or other abnormality. The stomach empties slowly into the duodenum. The duodenal bulb, sweep and proximal small bowel are normal.     IMPRESSION: Sliding hiatal hernia with esophageal dysmotility and presbyesophagus. No obstruction. Ct Abd Pelv W Cont    Result Date: 2/23/2018  EXAM:  CT ABD PELV W CONT INDICATION: Diffuse abdominal pain for 3 days. Emesis 3 days ago. 3 episodes of diarrhea over the past day. Previous hernia. Cholecystectomy and hernia repairs. Normal serum bilirubin. Normal white blood cell count. COMPARISON: CT abdomen/pelvis on 3/14/2016 and 9/17/2015. CONTRAST:  100 mL of Isovue-370. TECHNIQUE: Following the uneventful intravenous administration of contrast, thin axial images were obtained through the abdomen and pelvis. Coronal and sagittal reconstructions were generated. Oral contrast was not administered. CT dose reduction was achieved through use of a standardized protocol tailored for this examination and automatic exposure control for dose modulation.  Adaptive statistical iterative reconstruction (ASIR) was utilized. FINDINGS: LUNG BASES: No pneumonia. INCIDENTALLY IMAGED HEART AND MEDIASTINUM: Borderline cardiac size. Coronary artery calcific atherosclerosis is partially imaged. Mitral valve calcifications are partially imaged. Paraesophageal type hiatal hernia is similar to 2016. LIVER: Intrahepatic biliary dilatation is unchanged since 2016. No mass. Focal fatty infiltration adjacent to the falciform ligament is unchanged. Smooth surface. Normal size. GALLBLADDER: Resected. Dilated CBD to 12 mm is unchanged and likely normal for this patient given the normal cervical bilirubin. SPLEEN: Normal size. No mass or infarct. PANCREAS: No mass or ductal dilatation. ADRENALS: Unremarkable. KIDNEYS: Left renal cyst is unchanged. No solid renal mass or hydronephrosis. STOMACH: Nondistended. SMALL BOWEL: No dilatation or wall thickening. COLON: Colonic diverticulosis. No inflammation. APPENDIX: Unremarkable. PERITONEUM: No ascites or pneumoperitoneum. RETROPERITONEUM: No lymphadenopathy or aortic aneurysm. REPRODUCTIVE ORGANS: Uterus is present. No adnexal mass. URINARY BLADDER: No mass or calculus. BONES: Posterior element lumbar spine hardware is unchanged. Bones are osteopenic. No aggressive bone lesion. ADDITIONAL COMMENTS: No abdominal wall hernia. IMPRESSION: 1. Moderate paraesophageal type hiatal hernia is similar to 2016. 2. No bowel obstruction. 3. Colonic diverticulosis. No evidence of diverticulitis. 4. Unchanged chronic biliary dilatation is most likely the new normal for this patient postcholecystectomy sincerely bilirubin is within normal limits. [facility]    Result Date: 2/23/2018  EXAM:  US ABD LTD INDICATION: Upper abdominal pain for a few days. COMPARISON:  CT abdomen same time today. Abdominal wall ultrasound on 3/6/2017. TECHNIQUE:  Limited abdominal ultrasound. FINDINGS: Liver: echogenicity is within normal limits. No focal liver lesion.  Main portal vein flow: Toward the liver. Fluid: No ascites. Gallbladder: Surgically resected. Bile ducts: Biliary dilatation is unchanged. The common bile duct measures 7 mm. Pancreas: The visualized portions are within normal limits. Kidneys: Right length: 9.7 cm. No hydronephrosis. IMPRESSION: Within normal limits postcholecystectomy. See recent CT report.

## 2018-02-23 NOTE — PROGRESS NOTES
Admission Medication Reconciliation:    Information obtained from:  Patient, Rx Query    Comments/Recommendations: Updated PTA meds/reviewed patient's allergies. 1)  Spoke with patient for medication reconciliation. Added the following to PTA med list:  ibuprofen 200 mg daily, acetaminophen 1000 mg daily PRN. 2)  The patient is no longer taking the following medications:  bumetanide, bupivacaine-epinephrine, gabapentin, hydrocodone/acetaminophen, nystatin/triamcinolone cream, ondansetron, and tramadol. 3)  She stated that she had been taking Miralax BID but stopped about two weeks prior to this ED visit. Patient stated she takes a \"pill for allergies\", which she buys over the counter, however was not sure what medication it is. 4)  She did not take her medications today before coming to the ED, and stated she took \"some of them\" yesterday but was unsure of what she took. Significant PMH/Disease States:   Past Medical History:   Diagnosis Date    Arthritis     Cancer ([facility] [address]     [facility] ON FACE    DDD (degenerative disc disease)     Diabetes mellitus type II 8/20/2010    NO MEDS CURRENTLY, PT DENIES (9/7/16)    Family history of skin cancer     GERD (gastroesophageal reflux disease) 4/27/2010    Heart murmur     History of blood transfusion [address] NO REACTION    Hyperlipidemia LDL goal < 100     Hyperlipidemia LDL goal < 100 4/27/2010    Hypertension     Impaired fasting glucose     Osteoporosis     Skin cancer     Numerous BCC on face and ears    Sun-damaged skin     Thyroid disease     NO MEDS CURRENTLY (9/7/16)       Chief Complaint for this Admission:  No chief complaint on file. Allergies:  Latex; Pcn [penicillins]; Adhesive tape-silicones; and Codeine    Prior to Admission Medications:   Prior to Admission Medications   Prescriptions Last Dose Informant Patient Reported?  Taking?   acetaminophen (TYLENOL) 500 mg tablet   Yes Yes   Sig: Take 1,000 mg by mouth daily as needed for Pain. cyanocobalamin (VITAMIN B-12) 1,000 mcg tablet  Self Yes Yes   Sig: Take 1,000 mcg by mouth daily. furosemide (LASIX) 20 mg tablet   Yes Yes   Sig: TK 1 T PO QD   ibuprofen (MOTRIN) 200 mg tablet   Yes Yes   Sig: Take 200 mg by mouth daily. metoprolol (LOPRESSOR) 25 mg tablet  Self Yes Yes   Sig: Take 12.5 mg by mouth two (2) times a day. omeprazole (PRILOSEC) 20 mg capsule  Self Yes Yes   Sig: Take 20 mg by mouth nightly. 9 PM   traZODone (DESYREL) 50 mg tablet  Self Yes Yes   Sig: Take 50 mg by mouth nightly.       Facility-Administered Medications: None

## 2018-03-01 ENCOUNTER — OFFICE VISIT (OUTPATIENT)
Dept: SURGERY | Age: 83
End: 2018-03-01

## 2018-03-01 VITALS
RESPIRATION RATE: 20 BRPM | BODY MASS INDEX: 27.48 KG/M2 | HEIGHT: 60 IN | DIASTOLIC BLOOD PRESSURE: 86 MMHG | TEMPERATURE: 98.5 F | OXYGEN SATURATION: 98 % | HEART RATE: 60 BPM | WEIGHT: 140 LBS | SYSTOLIC BLOOD PRESSURE: 120 MMHG

## 2018-03-01 DIAGNOSIS — K21.9 GASTROESOPHAGEAL REFLUX DISEASE WITHOUT ESOPHAGITIS: ICD-10-CM

## 2018-03-01 DIAGNOSIS — K44.9 PARAESOPHAGEAL HERNIA: Primary | ICD-10-CM

## 2018-03-01 PROBLEM — R10.33 PERIUMBILICAL ABDOMINAL PAIN: Status: RESOLVED | Noted: 2017-02-21 | Resolved: 2018-03-01

## 2018-03-01 NOTE — PROGRESS NOTES
1. Have you been to the ER, urgent care clinic since your last visit? Hospitalized since your last visit? No    2. Have you seen or consulted any other health care providers outside of the [address] [place] [address] since your last visit? Include any pap smears or colon screening.  No

## 2018-03-02 NOTE — PATIENT INSTRUCTIONS

## 2018-03-03 NOTE — PROGRESS NOTES
Subjective:      Elijah Mckeon is a 80 y.o. female presents for follow-up after ED visit for abdominal pain, nausea/vomiting. She has history of gastric volvulus with PEH repair. Appetite is fair. Eating a soft diet without difficulty. Bowel movements are regular. The patient is voiding without difficulty. She denies abdominal pain; she notes GERD symptoms and mild dysphagia. No hematemesis. Objective:     Visit Vitals    /86    Pulse 60    Temp 98.5 °F (36.9 °C)    Resp 20    Ht 5' (1.524 m)    Wt 140 lb (63.5 kg)    SpO2 98%    BMI 27.34 kg/m2       General:  alert, cooperative, no distress, appears stated age   Abdomen: deferred   Incision:   deferred     Assessment:     Recurrent paraesophageal hernia. ED visit for nausea, vomiting, abdominal pain. CTA, UGI confirm recurrent hernia; UGI reveals altered esophageal motility. Plan:     1. Continue current medications. 2. Soft diet. 3. Upper endoscopy with Dr. Aniyah Tsang. 4. Follow-up after above.

## 2018-03-06 ENCOUNTER — ANESTHESIA (OUTPATIENT)
Dept: ENDOSCOPY | Age: 83
End: 2018-03-06
Payer: MEDICARE

## 2018-03-06 ENCOUNTER — HOSPITAL ENCOUNTER (OUTPATIENT)
Age: 83
Setting detail: OUTPATIENT SURGERY
Discharge: HOME | End: 2018-03-06
Attending: INTERNAL MEDICINE | Admitting: INTERNAL MEDICINE
Payer: MEDICARE

## 2018-03-06 ENCOUNTER — ANESTHESIA EVENT (OUTPATIENT)
Dept: ENDOSCOPY | Age: 83
End: 2018-03-06
Payer: MEDICARE

## 2018-03-06 VITALS
HEIGHT: 62 IN | OXYGEN SATURATION: 98 % | RESPIRATION RATE: 18 BRPM | SYSTOLIC BLOOD PRESSURE: 167 MMHG | DIASTOLIC BLOOD PRESSURE: 72 MMHG | BODY MASS INDEX: 25.76 KG/M2 | WEIGHT: 140 LBS | HEART RATE: 75 BPM

## 2018-03-06 PROCEDURE — 74011250636 HC RX REV CODE- 250/636

## 2018-03-06 PROCEDURE — 76040000019: Performed by: INTERNAL MEDICINE

## 2018-03-06 PROCEDURE — 76060000031 HC ANESTHESIA FIRST 0.5 HR: Performed by: INTERNAL MEDICINE

## 2018-03-06 RX ORDER — MIDAZOLAM HYDROCHLORIDE 1 MG/ML
.25-1 INJECTION, SOLUTION INTRAMUSCULAR; INTRAVENOUS
Status: DISCONTINUED | OUTPATIENT
Start: 2018-03-06 | End: 2018-03-06 | Stop reason: HOSPADM

## 2018-03-06 RX ORDER — SODIUM CHLORIDE 0.9 % (FLUSH) 0.9 %
5-10 SYRINGE (ML) INJECTION EVERY 8 HOURS
Status: DISCONTINUED | OUTPATIENT
Start: 2018-03-06 | End: 2018-03-06 | Stop reason: HOSPADM

## 2018-03-06 RX ORDER — FLUMAZENIL 0.1 MG/ML
0.2 INJECTION INTRAVENOUS
Status: DISCONTINUED | OUTPATIENT
Start: 2018-03-06 | End: 2018-03-06 | Stop reason: HOSPADM

## 2018-03-06 RX ORDER — SODIUM CHLORIDE 9 MG/ML
INJECTION, SOLUTION INTRAVENOUS
Status: DISCONTINUED | OUTPATIENT
Start: 2018-03-06 | End: 2018-03-06 | Stop reason: HOSPADM

## 2018-03-06 RX ORDER — NALOXONE HYDROCHLORIDE 0.4 MG/ML
0.4 INJECTION, SOLUTION INTRAMUSCULAR; INTRAVENOUS; SUBCUTANEOUS
Status: DISCONTINUED | OUTPATIENT
Start: 2018-03-06 | End: 2018-03-06 | Stop reason: HOSPADM

## 2018-03-06 RX ORDER — SODIUM CHLORIDE 0.9 % (FLUSH) 0.9 %
5-10 SYRINGE (ML) INJECTION AS NEEDED
Status: DISCONTINUED | OUTPATIENT
Start: 2018-03-06 | End: 2018-03-06 | Stop reason: HOSPADM

## 2018-03-06 RX ORDER — EPINEPHRINE 0.1 MG/ML
1 INJECTION INTRACARDIAC; INTRAVENOUS
Status: DISCONTINUED | OUTPATIENT
Start: 2018-03-06 | End: 2018-03-06 | Stop reason: HOSPADM

## 2018-03-06 RX ORDER — ATROPINE SULFATE 0.1 MG/ML
0.5 INJECTION INTRAVENOUS
Status: DISCONTINUED | OUTPATIENT
Start: 2018-03-06 | End: 2018-03-06 | Stop reason: HOSPADM

## 2018-03-06 RX ORDER — DIPHENHYDRAMINE HYDROCHLORIDE 50 MG/ML
50 INJECTION, SOLUTION INTRAMUSCULAR; INTRAVENOUS ONCE
Status: DISCONTINUED | OUTPATIENT
Start: 2018-03-06 | End: 2018-03-06 | Stop reason: HOSPADM

## 2018-03-06 RX ORDER — SODIUM CHLORIDE 9 MG/ML
100 INJECTION, SOLUTION INTRAVENOUS CONTINUOUS
Status: DISCONTINUED | OUTPATIENT
Start: 2018-03-06 | End: 2018-03-06 | Stop reason: HOSPADM

## 2018-03-06 RX ORDER — FENTANYL CITRATE 50 UG/ML
100 INJECTION, SOLUTION INTRAMUSCULAR; INTRAVENOUS
Status: DISCONTINUED | OUTPATIENT
Start: 2018-03-06 | End: 2018-03-06 | Stop reason: HOSPADM

## 2018-03-06 RX ORDER — DEXTROMETHORPHAN/PSEUDOEPHED 2.5-7.5/.8
1.2 DROPS ORAL
Status: DISCONTINUED | OUTPATIENT
Start: 2018-03-06 | End: 2018-03-06 | Stop reason: HOSPADM

## 2018-03-06 RX ORDER — PROPOFOL 10 MG/ML
INJECTION, EMULSION INTRAVENOUS AS NEEDED
Status: DISCONTINUED | OUTPATIENT
Start: 2018-03-06 | End: 2018-03-06 | Stop reason: HOSPADM

## 2018-03-06 RX ADMIN — PROPOFOL 80 MG: 10 INJECTION, EMULSION INTRAVENOUS at 15:47

## 2018-03-06 RX ADMIN — SODIUM CHLORIDE: 9 INJECTION, SOLUTION INTRAVENOUS at 15:47

## 2018-03-06 RX ADMIN — PROPOFOL 40 MG: 10 INJECTION, EMULSION INTRAVENOUS at 15:49

## 2018-03-06 RX ADMIN — PROPOFOL 30 MG: 10 INJECTION, EMULSION INTRAVENOUS at 15:51

## 2018-03-06 NOTE — PROCEDURES
[address]  [address]          Esophago- Gastroduodenoscopy (EGD) Procedure Note    Kim Santana  11/5/1928  [id number]      Procedure: Endoscopic Gastroduodenoscopy - diagnostic    Indication: history of para-esophageal hernia repair, recent UGI series with sliding hiatal hernia with esophageal dysmotility and  presbyesophagus    Pre-operative Diagnosis: see indication above    Post-operative Diagnosis: see findings below    : Hector Wilks MD    Referring Provider:  Ralf Soni MD      Anesthesia/Sedation:  MAC anesthesia Propofol        Procedure Details     After informed consent was obtained for the procedure, with all risks and benefits of procedure explained the patient was taken to the endoscopy suite and placed in the left lateral decubitus position. Following sequential administration of sedation as per above, the endoscope was inserted into the mouth and advanced under direct vision to second portion of the duodenum. A careful inspection was made as the gastroscope was withdrawn, including a retroflexed view of the proximal stomach; findings and interventions are described below. Findings:   Esophagus: normal appearing esophagus  Stomach: a large hiatal hernia was present. The diaphragmatic hiatus was seen at 40 cm. The top of the gastric folds were seen at 36 cm. No [place] erosions seen. Some minimal retained food in stomach. Pyloric patent. No evidence of volvulus. Duodenum: normal to second portion      Therapies: as above    Specimens: none         EBL: None      Complications:   None; patient tolerated the procedure well. Impression:    Large hiatal hernia    Recommendations:  PPI  Follow up with Dr. Juana King NSAID use    Signed By: Hector Wilks MD     3/6/2018  3:56 PM

## 2018-03-06 NOTE — ANESTHESIA PREPROCEDURE EVALUATION
Anesthetic History   No history of anesthetic complications            Review of Systems / Medical History  Patient summary reviewed, nursing notes reviewed and pertinent labs reviewed    Pulmonary  Within defined limits                 Neuro/Psych   Within defined limits           Cardiovascular    Hypertension: well controlled              Exercise tolerance: >4 METS     GI/Hepatic/Renal     GERD          Comments: history of gastric volvulus   Para esophageal hernia Endo/Other    Diabetes  Hypothyroidism  Arthritis     Other Findings            Physical Exam    Airway  Mallampati: I  TM Distance: > 6 cm  Neck ROM: normal range of motion   Mouth opening: Normal     Cardiovascular    Rhythm: regular  Rate: normal         Dental  No notable dental hx       Pulmonary  Breath sounds clear to auscultation               Abdominal         Other Findings            Anesthetic Plan    ASA: 3  Anesthesia type: MAC          Induction: Intravenous  Anesthetic plan and risks discussed with: Patient

## 2018-03-06 NOTE — IP AVS SNAPSHOT
[address] 
[phone] Patient: Siddharth Robles MRN: [id number] VFO:[id number] About your hospitalization You were admitted on:  March 6, 2018 You last received care in the:  [facility] ENDOSCOPY You were discharged on:  March 6, 2018 Why you were hospitalized Your primary diagnosis was:  Not on File Follow-up Information None Discharge Orders None A check saeid indicates which time of day the medication should be taken. My Medications CONTINUE taking these medications Instructions Each Dose to Equal  
 Morning Noon Evening Bedtime  
 acetaminophen 500 mg tablet Commonly known as:  TYLENOL Your last dose was: Your next dose is: Take 1,000 mg by mouth daily as needed for Pain. 1000 mg CHOLESTYRAMINE LIGHT 4 gram powder Generic drug:  Cholestyramine-Aspartame Your last dose was: Your next dose is: Take  by mouth three (3) times daily (with meals). furosemide 20 mg tablet Commonly known as:  LASIX Your last dose was: Your next dose is:    
   
   
 TK 1 T PO QD  
     
   
   
   
  
 metoprolol tartrate 25 mg tablet Commonly known as:  LOPRESSOR Your last dose was: Your next dose is: Take 12.5 mg by mouth two (2) times a day. 12.5 mg  
    
   
   
   
  
 omeprazole 20 mg capsule Commonly known as:  PRILOSEC Your last dose was: Your next dose is: Take 20 mg by mouth nightly. 9 PM  
 20 mg  
    
   
   
   
  
 traZODone 50 mg tablet Commonly known as:  Chary Pacific Your last dose was: Your next dose is: Take 50 mg by mouth nightly. 50 mg  
    
   
   
   
  
 VITAMIN B-12 1,000 mcg tablet Generic drug:  cyanocobalamin Your last dose was: Your next dose is: Take 1,000 mcg by mouth daily. 1000 mcg STOP taking these medications   
 ibuprofen 200 mg tablet Commonly known as:  MOTRIN Discharge Instructions [address] 
[address] [facility], [address] [place] [address] EGD DISCHARGE INSTRUCTIONS Rosi Colunga [phone] 
11/5/1928 Discomfort: 
Sore throat- throat lozenges or warm salt water gargle 
redness at IV site- apply warm compress to area; if redness or soreness persist- contact your physician Gaseous discomfort- walking, belching will help relieve any discomfort You may not operate a vehicle for 12 hours You may not engage in an occupation involving machinery or appliances for rest of today You may not drink alcoholic beverages for at least 12 hours Avoid making any critical decisions for at least 24 hour DIET You may resume your regular diet  however -  remember your colon is empty and a heavy meal will produce gas. Avoid these foods:  vegetables, fried / greasy foods, carbonated drinks ACTIVITY You may resume your normal daily activities Spend the remainder of the day resting -  avoid any strenuous activity. CALL M.D. ANY SIGN OF Increasing pain, nausea, vomiting Abdominal distension (swelling) New increased bleeding (oral or rectal) Fever (chills) Pain in chest area Bloody discharge from nose or mouth Shortness of breath Follow-up Instructions: 
 Call Dr. Rosanne Allen for any questions or problems. Telephone # [phone] ENDOSCOPY FINDINGS: 
 Your endoscopy showed a hiatal hernia. Please continue omeprazole. Please minimize use of NSAID's (including ibuprofen). Please follow up with Dr. Jocelyne Schuster. Signed By: Dustin Chang. Alissa Acevedo MD   
 3/6/2018  3:55 PM 
  
 
 
  
  
  
Introducing [facility]! Dear Nas Parnell: Thank you for requesting a Cloneless account.   Our records indicate that you already have an active TicketBase account. You can access your account anytime at [email or web address] Did you know that you can access your hospital and ER discharge instructions at any time in TicketBase? You can also review all of your test results from your hospital stay or ER visit. Additional Information If you have questions, please visit the Frequently Asked Questions section of the TicketBase website at [email or web address] Remember, TicketBase is NOT to be used for urgent needs. For medical emergencies, dial 911. Now available from your iPhone and Android! Providers Seen During Your Hospitalization Provider Specialty Primary office phone Sohail Colon MD Gastroenterology [phone] Your Primary Care Physician (PCP) Primary Care Physician Office Phone Office Fax Alric Deni [phone] You are allergic to the following Allergen Reactions Latex Itching Pcn (Penicillins) Anaphylaxis Adhesive Tape-Silicones Itching Other (comments) REDNESS Codeine Nausea and Vomiting Recent Documentation Height Weight Breastfeeding? BMI OB Status Smoking Status 1.524 m 63.5 kg No 27.34 kg/m2 Postmenopausal Former Smoker Emergency Contacts Name Discharge Info Relation Home Work Mobile Princess Numbers  Child [2] [phone] Beth Babb  Child [2]   [phone] [address] [place] [address] CAREGIVER [3] Other Relative [6] [phone]  
 F,(Wood)  Child [2] [phone] Patient Belongings The following personal items are in your possession at time of discharge: 
  Dental Appliances: None  Visual Aid: None Please provide this summary of care documentation to your next provider.  
  
  
 
  
Signatures-by signing, you are acknowledging that this After Visit Summary has been reviewed with you and you have received a copy. Patient Signature:  ____________________________________________________________ Date:  ____________________________________________________________  
  
Shirley Sleeper Provider Signature:  ____________________________________________________________ Date:  ____________________________________________________________

## 2018-03-06 NOTE — ANESTHESIA POSTPROCEDURE EVALUATION
Post-Anesthesia Evaluation and Assessment    Patient: Mark Rice MRN: [id number]  SSN: [id number]    YOB: 1928  Age: 80 y.o. Sex: female       Cardiovascular Function/Vital Signs  Visit Vitals    /72    Pulse 75    Resp 18    Ht 5' 2\" (1.575 m)    Wt 63.5 kg (140 lb)    SpO2 98%    Breastfeeding No    BMI 25.61 kg/m2       Patient is status post MAC anesthesia for Procedure(s):  ESOPHAGOGASTRODUODENOSCOPY (EGD). Nausea/Vomiting: None    Postoperative hydration reviewed and adequate. Pain:  Pain Scale 1: Numeric (0 - 10) (03/06/18 1621)  Pain Intensity 1: 0 (03/06/18 1621)   Managed    Neurological Status: At baseline    Mental Status and Level of Consciousness: Arousable    Pulmonary Status:   O2 Device: Room air (03/06/18 1600)   Adequate oxygenation and airway patent    Complications related to anesthesia: None    Post-anesthesia assessment completed.  No concerns    Signed By: Silvio Saravia MD     March 6, 2018

## 2018-03-06 NOTE — PROGRESS NOTES

## 2018-03-06 NOTE — ROUTINE PROCESS
Manuel Goode  11/5/1928  [id number]    Situation:  Verbal report received from: Sanjuana Rosanna  Procedure: Procedure(s):  ESOPHAGOGASTRODUODENOSCOPY (EGD)    Background:    Preoperative diagnosis: ABDOMINAL PAIN   Postoperative diagnosis: 1.- Hiatal Hernia    :  Dr. Alana Alamo  Assistant(s): Endoscopy Technician-1: Kelley Dalal  Endoscopy RN-1: Topher Castellanos RN    Specimens: * No specimens in log *  H. Pylori  no    Assessment:  Intra-procedure medications   Anesthesia gave intra-procedure sedation and medications, see anesthesia flow sheet yes    Intravenous fluids: NS@ KVO     Vital signs stable     Abdominal assessment: round and soft     Recommendation:  Discharge patient per MD order.   Family or Friend   Permission to share finding with family or friend yes

## 2018-03-06 NOTE — H&P
[address]  174 [facility], [address] [place] [address]      History and Physical       NAME:  Iron Sheffield   :   1928   MRN:   [id number]             History of Present Illness:  Patient is a 80 y.o. who is seen for para-esophageal hernia recurrence following repair. PMH:  Past Medical History:   Diagnosis Date    Arthritis     Cancer ([address]     [facility] ON FACE    DDD (degenerative disc disease)     Diabetes mellitus type II 2010    NO MEDS CURRENTLY, PT DENIES (16)    Family history of skin cancer     GERD (gastroesophageal reflux disease) 2010    Heart murmur     History of blood transfusion [address], NO REACTION    Hyperlipidemia LDL goal < 100     Hyperlipidemia LDL goal < 100 2010    Hypertension     Impaired fasting glucose     Osteoporosis     Skin cancer     Numerous BCC on face and ears    Sun-damaged skin     Thyroid disease     NO MEDS CURRENTLY (16)       PSH:  Past Surgical History:   Procedure Laterality Date    HX BACK SURGERY      SOME SURGERY ON LOWER BACK    HX BREAST BIOPSY      BENIGN, HAD FIBROCYST    HX CATARACT REMOVAL Bilateral     W/LENS IMPLANT    HX CHOLECYSTECTOMY      HX GI      ENDOSCOPY, STRETCHING OF ESOPHAGOUS    HX GI      COLONOSCOPY    HX HEART CATHETERIZATION      NO STENTS    HX HERNIA REPAIR  9/17/15    Laparoscopic paraesophageal hernia repair with mesh  by Dr Judith Kyle  15/93/5    lap umbilical hernia repair-[facility]-Dr. DALLIN Abdi    HX KNEE REPLACEMENT Left 2013    HX LAP CHOLECYSTECTOMY      HX OTHER SURGICAL      BCCA ON FACE    HX UROLOGICAL      ? BLADDER TUCK    REMOVAL GALLBLADDER      TOTAL KNEE ARTHROPLASTY Right        Allergies:   Allergies   Allergen Reactions    Latex Itching    Pcn [Penicillins] Anaphylaxis    Adhesive Tape-Silicones Itching and Other (comments)     REDNESS    Codeine Nausea and Vomiting       Home Medications:  Prior to Admission Medications   Prescriptions Last Dose Informant Patient Reported? Taking? Cholestyramine-Aspartame (CHOLESTYRAMINE LIGHT) 4 gram powder 2/27/2018 at Unknown time  Yes Yes   Sig: Take  by mouth three (3) times daily (with meals). acetaminophen (TYLENOL) 500 mg tablet 2/6/2018 at Unknown time  Yes Yes   Sig: Take 1,000 mg by mouth daily as needed for Pain. cyanocobalamin (VITAMIN B-12) 1,000 mcg tablet 3/5/2018 at Unknown time Self Yes Yes   Sig: Take 1,000 mcg by mouth daily. furosemide (LASIX) 20 mg tablet 3/5/2018 at Unknown time  Yes Yes   Sig: TK 1 T PO QD   ibuprofen (MOTRIN) 200 mg tablet 3/5/2018 at Unknown time  Yes Yes   Sig: Take 200 mg by mouth daily. metoprolol (LOPRESSOR) 25 mg tablet 3/6/2018 at Unknown time Self Yes Yes   Sig: Take 12.5 mg by mouth two (2) times a day. omeprazole (PRILOSEC) 20 mg capsule 3/5/2018 at Unknown time Self Yes Yes   Sig: Take 20 mg by mouth nightly. 9 PM   traZODone (DESYREL) 50 mg tablet 2/27/2018 at Unknown time Self Yes Yes   Sig: Take 50 mg by mouth nightly.       Facility-Administered Medications: None       Hospital Medications:  Current Facility-Administered Medications   Medication Dose Route Frequency    0.9% sodium chloride infusion  100 mL/hr IntraVENous CONTINUOUS    sodium chloride (NS) flush 5-10 mL  5-10 mL IntraVENous Q8H    sodium chloride (NS) flush 5-10 mL  5-10 mL IntraVENous PRN    midazolam (VERSED) injection 0.25-10 mg  0.25-10 mg IntraVENous Multiple    fentaNYL citrate (PF) injection 100 mcg  100 mcg IntraVENous Multiple    naloxone (NARCAN) injection 0.4 mg  0.4 mg IntraVENous Multiple    flumazenil (ROMAZICON) 0.1 mg/mL injection 0.2 mg  0.2 mg IntraVENous Multiple    simethicone (MYLICON) 14BL/0.8MQ oral drops 80 mg  1.2 mL Oral Multiple    diphenhydrAMINE (BENADRYL) injection 50 mg  50 mg IntraVENous ONCE    atropine injection 0.5 mg  0.5 mg IntraVENous ONCE PRN    EPINEPHrine (ADRENALIN) 0.1 mg/mL syringe 1 mg  1 mg Endoscopically ONCE PRN    0.9% sodium chloride infusion  100 mL/hr IntraVENous CONTINUOUS    sodium chloride (NS) flush 5-10 mL  5-10 mL IntraVENous Q8H    sodium chloride (NS) flush 5-10 mL  5-10 mL IntraVENous PRN    midazolam (VERSED) injection 0.25-10 mg  0.25-10 mg IntraVENous Multiple    fentaNYL citrate (PF) injection 100 mcg  100 mcg IntraVENous Multiple    naloxone (NARCAN) injection 0.4 mg  0.4 mg IntraVENous Multiple    flumazenil (ROMAZICON) 0.1 mg/mL injection 0.2 mg  0.2 mg IntraVENous Multiple    simethicone (MYLICON) 07IW/6.3YM oral drops 80 mg  1.2 mL Oral Multiple    diphenhydrAMINE (BENADRYL) injection 50 mg  50 mg IntraVENous ONCE    atropine injection 0.5 mg  0.5 mg IntraVENous ONCE PRN    EPINEPHrine (ADRENALIN) 0.1 mg/mL syringe 1 mg  1 mg Endoscopically ONCE PRN       Social History:  Social History   Substance Use Topics    Smoking status: Former Smoker     Packs/day: 0.25     Years: 45.00     Quit date: 11/13/1993    Smokeless tobacco: Never Used      Comment: 2-3 CIGARETTES PER DAY    Alcohol use Yes      Comment: RARELY       Family History:  Family History   Problem Relation Age of Onset    Hypertension Mother     Diabetes Mother     Heart Failure Mother     Heart Disease Mother     Arthritis-osteo Mother     Stroke Father     Kidney Disease Father      DAMAGED KIDNEY AFTER A FALL    Arthritis-osteo Father     Cancer Sister      PANCREATIC AND LIVER    Anesth Problems Neg Hx              Review of Systems:      Constitutional: negative fever, negative chills, negative weight loss  Eyes:   negative visual changes  ENT:   negative sore throat, tongue or lip swelling  Respiratory:  negative cough, negative dyspnea  Cards:  negative for chest pain, palpitations, lower extremity edema  GI:   See HPI  :  negative for frequency, dysuria  Integument:  negative for rash and pruritus  Heme:  negative for easy bruising and gum/nose bleeding  Musculoskel: negative for myalgias,  back pain and muscle weakness  Neuro: negative for headaches, dizziness, vertigo  Psych:  negative for feelings of anxiety, depression       Objective:   Patient Vitals for the past 8 hrs:   BP Pulse Resp SpO2 Height Weight   03/06/18 1506 (!) 164/96 61 18 97 % 5' (1.524 m) 63.5 kg (140 lb)             EXAM:     NEURO-a&o   HEENT-wnl   LUNGS-clear    COR-regular rate and rhythym     ABD-soft , no tenderness, no rebound, good bs     EXT-no edema     Data Review     No results for input(s): WBC, HGB, HCT, PLT, HGBEXT, HCTEXT, PLTEXT in the last 72 hours. No results for input(s): NA, K, CL, CO2, BUN, CREA, GLU, PHOS, CA in the last 72 hours. No results for input(s): SGOT, GPT, AP, TBIL, TP, ALB, GLOB, GGT, AML, LPSE in the last 72 hours. No lab exists for component: AMYP, HLPSE  No results for input(s): INR, PTP, APTT in the last 72 hours. No lab exists for component: INREXT       Assessment:   · Paraesophageal hernia     Patient Active Problem List   Diagnosis Code    HTN (hypertension) I10    Hyperlipidemia LDL goal < 100 E78.5    DJD (degenerative joint disease) M19.90    DDD (degenerative disc disease) [id number]    GERD (gastroesophageal reflux disease) K21.9    Diabetes mellitus type II     Arthritis of knee, left M17.12    Paraesophageal hernia K44.9    Incisional hernia K43.2     Plan:   · Endoscopic procedure with MAC     Signed By: Silverio Terry.  Sotero Albarran MD     3/6/2018  3:42 PM

## 2018-03-06 NOTE — DISCHARGE INSTRUCTIONS
[address]  174 [facility], [address] [place] [address]    EGD DISCHARGE INSTRUCTIONS    Dominguez Jeffery  [id number]  11/5/1928    Discomfort:  Sore throat- throat lozenges or warm salt water gargle  redness at IV site- apply warm compress to area; if redness or soreness persist- contact your physician  Gaseous discomfort- walking, belching will help relieve any discomfort  You may not operate a vehicle for 12 hours  You may not engage in an occupation involving machinery or appliances for rest of today  You may not drink alcoholic beverages for at least 12 hours  Avoid making any critical decisions for at least 24 hour  DIET  You may resume your regular diet - however -  remember your colon is empty and a heavy meal will produce gas. Avoid these foods:  vegetables, fried / greasy foods, carbonated drinks    ACTIVITY  You may resume your normal daily activities   Spend the remainder of the day resting -  avoid any strenuous activity. CALL M.D. ANY SIGN OF   Increasing pain, nausea, vomiting  Abdominal distension (swelling)  New increased bleeding (oral or rectal)  Fever (chills)  Pain in chest area  Bloody discharge from nose or mouth  Shortness of breath    Follow-up Instructions:   Call Dr. Lokesh Schaefer for any questions or problems. Telephone # [phone]    ENDOSCOPY FINDINGS:   Your endoscopy showed a hiatal hernia. Please continue omeprazole. Please minimize use of NSAID's (including ibuprofen). Please follow up with Dr. Juanito Castaneda. Signed By: Hedy Neely.  Francoise Villegas MD     3/6/2018  3:55 PM

## 2018-03-19 ENCOUNTER — TELEPHONE (OUTPATIENT)
Dept: SURGERY | Age: 83
End: 2018-03-19

## 2018-03-20 DIAGNOSIS — R79.89 ELEVATED LFTS: Primary | ICD-10-CM

## 2018-03-20 NOTE — TELEPHONE ENCOUNTER
I called the patient and she said dr Wilbert Ji told her she needed to have her liver labs repeated and she said she needs an order for this. She said Dr Leigh Ann Carlson will not redo the labs, she also said she has stopped taking the Tylenol as she said Dr Wilbert Ji said that may have affected her liver labs as well. I told her I will speak to dr Wilbert Ji and call her back if need be if not I will mail her the Lab slip as she has requested. Pt in agreement.

## 2018-05-21 ENCOUNTER — TELEPHONE (OUTPATIENT)
Dept: SURGERY | Age: 83
End: 2018-05-21

## 2018-05-21 NOTE — TELEPHONE ENCOUNTER
I called the patient and she said she had labs done by Dr Corey Morocho and she said Dr Pedrito mcdonald faxed the labs to us. I do not have these labs and I will call Dr Pedrito mcdonald and ask them to fax to us. I called dr Pedrito mcdonald and they are faxing me the labs. I will give them to Dr Maliha Woods for review when I get them.

## 2018-05-21 NOTE — TELEPHONE ENCOUNTER
Please call patient. She stated she was ordered to have a test done about a month ago and she did the test two weeks ago. She has not heard from anyone regarding the results.

## 2018-05-22 ENCOUNTER — TELEPHONE (OUTPATIENT)
Dept: SURGERY | Age: 83
End: 2018-05-22

## 2018-05-22 NOTE — TELEPHONE ENCOUNTER
I called the patient after Dr Betzy Toledo reviewed her Hepatic panel faxed over from Dr Rhonda Ruiz office and he said he wants to see her in the office to see how she is doing. I spoke to the patient and she said she will call us back to schedule after she speaks to her daughter in law to see when she can bring her to the office. Pt in agreement.

## 2018-05-29 ENCOUNTER — OFFICE VISIT (OUTPATIENT)
Dept: SURGERY | Age: 83
End: 2018-05-29

## 2018-05-29 VITALS
SYSTOLIC BLOOD PRESSURE: 140 MMHG | HEART RATE: 62 BPM | TEMPERATURE: 99 F | RESPIRATION RATE: 20 BRPM | WEIGHT: 141.5 LBS | HEIGHT: 62 IN | DIASTOLIC BLOOD PRESSURE: 96 MMHG | OXYGEN SATURATION: 93 % | BODY MASS INDEX: 26.04 KG/M2

## 2018-05-29 DIAGNOSIS — K44.9 PARAESOPHAGEAL HERNIA: Primary | ICD-10-CM

## 2018-05-29 RX ORDER — TRAMADOL HYDROCHLORIDE 50 MG/1
50 TABLET ORAL
COMMUNITY

## 2018-05-29 NOTE — PATIENT INSTRUCTIONS
Hiatal Hernia: Care Instructions  Your Care Instructions  A hiatal hernia occurs when part of the stomach bulges into the chest cavity. A hiatal hernia may allow stomach acid and juices to back up into the esophagus (acid reflux). This can cause a feeling of burning, warmth, heat, or pain behind the breastbone. This feeling may often occur after you eat, soon after you lie down, or when you bend forward, and it may come and go. You also may have a sour taste in your mouth. These symptoms are commonly known as heartburn or reflux. But not all hiatal hernias cause symptoms. Follow-up care is a key part of your treatment and safety. Be sure to make and go to all appointments, and call your doctor if you are having problems. It's also a good idea to know your test results and keep a list of the medicines you take. How can you care for yourself at home? · Take your medicines exactly as prescribed. Call your doctor if you think you are having a problem with your medicine. · Do not take aspirin or other nonsteroidal anti-inflammatory drugs (NSAIDs), such as ibuprofen (Advil, Motrin) or naproxen (Aleve), unless your doctor says it is okay. Ask your doctor what you can take for pain. · Your doctor may recommend over-the-counter medicine. For mild or occasional indigestion, antacids such as Tums, Gaviscon, Maalox, or Mylanta may help. Your doctor also may recommend over-the-counter acid reducers, such as famotidine (Pepcid AC), cimetidine (Tagamet HB), ranitidine (Zantac 75 and Zantac 150), or omeprazole (Prilosec). Read and follow all instructions on the label. If you use these medicines often, talk with your doctor. · Change your eating habits. ¨ It's best to eat several small meals instead of two or three large meals. ¨ After you eat, wait 2 to 3 hours before you lie down. Late-night snacks aren't a good idea. ¨ Chocolate, mint, and alcohol can make heartburn worse.  They relax the valve between the esophagus and the stomach. ¨ Spicy foods, foods that have a lot of acid (like tomatoes and oranges), and coffee can make heartburn symptoms worse in some people. If your symptoms are worse after you eat a certain food, you may want to stop eating that food to see if your symptoms get better. · Do not smoke or chew tobacco.  · If you get heartburn at night, raise the head of your bed 6 to 8 inches by putting the frame on blocks or placing a foam wedge under the head of your mattress. (Adding extra pillows does not work.)  · Do not wear tight clothing around your middle. · Lose weight if you need to. Losing just 5 to 10 pounds can help. When should you call for help? Call your doctor now or seek immediate medical care if:  ? · You have new or worse belly pain. ? · You are vomiting. ? Watch closely for changes in your health, and be sure to contact your doctor if:  ? · You have new or worse symptoms of indigestion. ? · You have trouble or pain swallowing. ? · You are losing weight. ? · You do not get better as expected. Where can you learn more? Go to http://na-sarah.info/. Enter F413 in the search box to learn more about \"Hiatal Hernia: Care Instructions. \"  Current as of: May 12, 2017  Content Version: 11.4  © 0932-0659 [employer]. Care instructions adapted under license by [employer] (which disclaims liability or warranty for this information). If you have questions about a medical condition or this instruction, always ask your healthcare professional. [place] [address] any warranty or liability for your use of this information.

## 2018-05-29 NOTE — PROGRESS NOTES
Subjective:      Tomasa Mccarthy is a 80 y.o. female presents for re-evaluation of recurrent paraesophageal hernia. Appetite is good. Eating a regular diet without difficulty. Bowel movements are regular. The patient is voiding without difficulty. The patient is not having any pain. No GERD symptoms while on Prilosec. No dysphagia or hematemesis. Objective:     Visit Vitals    BP (!) 140/96    Pulse 62    Temp 99 °F (37.2 °C)    Resp 20    Ht 5' 2\" (1.575 m)    Wt 141 lb 8 oz (64.2 kg)    SpO2 93%    BMI 25.88 kg/m2       General:  alert, cooperative, no distress, appears stated age   Abdomen: soft, non-tender, no hernias   Incision:   well healed     EGD: recurrent paraesophageal hernia (no esophagitis/gastritis, no ulcer)    Assessment:     Recurrent paraesophageal hernia; minimal symptoms. Given minimal symptoms and patient's advanced age, recommended conservative management-patient in agreement. Plan:     1. Continue current medications. Patient advised to limit Tylenol use. 2. Diet as tolerated. 3. Follow-up as needed.

## 2018-07-16 NOTE — MR AVS SNAPSHOT
Visit Information Date & Time Provider Department Dept. Phone Encounter #  
 6/7/2017  2:45 PM Vamshi Marquez NP [facility] [address] [phone] [id number] Upcoming Health Maintenance Date Due  
 LIPID PANEL Q1 11/5/1928 FOOT EXAM Q1 11/5/1938 MICROALBUMIN Q1 11/5/1938 EYE EXAM RETINAL OR DILATED Q1 11/5/1938 ZOSTER VACCINE AGE 60> 11/5/1988 GLAUCOMA SCREENING Q2Y 11/5/1993 MEDICARE YEARLY EXAM 11/5/1993 DTaP/Tdap/Td series (1 - Tdap) 3/16/2012 HEMOGLOBIN A1C Q6M 5/13/2014 Pneumococcal 65+ Low/Medium Risk (2 of 2 - PCV13) 9/22/2016 INFLUENZA AGE 9 TO ADULT 8/1/2017 Allergies as of 6/7/2017  Review Complete On: 6/7/2017 By: Dc Oconnell Severity Noted Reaction Type Reactions Latex  11/13/2013    Itching Pcn [Penicillins] High 04/27/2010    Anaphylaxis Adhesive Tape-silicones  29/74/3759    Itching, Other (comments) REDNESS Codeine  04/27/2010    Nausea and Vomiting Current Immunizations  Reviewed on 9/21/2015 Name Date Pneumococcal Polysaccharide (PPSV-23) 9/22/2015 10:48 AM  
 TD Vaccine 3/15/2012 10:41 PM  
  
 Not reviewed this visit Vitals BP Pulse Temp Height(growth percentile) Weight(growth percentile) SpO2  
 122/74 (BP 1 Location: Left arm, BP Patient Position: Sitting) 72 98.2 °F (36.8 °C) (Oral) 5' (1.524 m) 141 lb (64 kg) 97% BMI OB Status Smoking Status 27.54 kg/m2 Postmenopausal Former Smoker Vitals History BMI and BSA Data Body Mass Index Body Surface Area  
 27.54 kg/m 2 1.65 m 2 Preferred Pharmacy Pharmacy Name Phone [facility] [address] [place] [address] Drive, [address] [place] [address] [phone] Your Updated Medication List  
  
   
This list is accurate as of: 6/7/17  3:03 PM.  Always use your most recent med list.  
  
  
  
  
 bumetanide 0.5 mg tablet Commonly known as:  Faustine Denver Take 0.5 mg by mouth daily. bupivacaine-EPINEPHrine 0.25 %-1:200,000 Soln Commonly known as:  Suzia Hilton Used for mohs surgery  
  
 furosemide 20 mg tablet Commonly known as:  LASIX TK 1 T PO QD  
  
 * gabapentin 100 mg capsule Commonly known as:  NEURONTIN Take 100 mg by mouth nightly. * gabapentin 300 mg capsule Commonly known as:  NEURONTIN  
TK ONE C PO  ONCE A DAY HYDROcodone-acetaminophen 5-325 mg per tablet Commonly known as:  [place]igh Take 1 Tab by mouth every four (4) hours as needed for Pain. Max Daily Amount: 6 Tabs. metoprolol tartrate 25 mg tablet Commonly known as:  LOPRESSOR Take 12.5 mg by mouth two (2) times a day. nystatin-triamcinolone topical cream  
Commonly known as:  MYCOLOG II Apply  to affected area as needed. (sacral wound) omeprazole 20 mg capsule Commonly known as:  PRILOSEC Take 20 mg by mouth nightly. 9 PM  
  
 ondansetron 4 mg disintegrating tablet Commonly known as:  ZOFRAN ODT Take 1 Tab by mouth every eight (8) hours as needed for Nausea. traMADol 50 mg tablet Commonly known as:  ULTRAM  
Take 50 mg by mouth every six (6) hours as needed for Pain. traZODone 50 mg tablet Commonly known as:  Auther Sax Take 50 mg by mouth nightly. VITAMIN B-12 1,000 mcg tablet Generic drug:  cyanocobalamin Take 1,000 mcg by mouth daily. * Notice: This list has 2 medication(s) that are the same as other medications prescribed for you. Read the directions carefully, and ask your doctor or other care provider to review them with you. Patient Instructions Self Skin Exam and Sunscreens Early detection and treatment is essential in the treatment of all forms of skin cancer. If caught early, all forms of skin cancer are curable. In addition to your regular visits, you should perform a monthly skin examination.   Over time, you become familiar with what is normally found on your skin and can identify new or suspicious spots. One of the screening tools you can use to assess your skin is to follow the ABCDEs: 
 
A= Asymmetry (One half is unlike the other half) B= Border (An irregular, scalloped or poorly defined edge) C= Color (Is varied from one area to another, has shades of tan, brown/ black,       white, red or blue) D= Diameter (Spots larger than 6mm or a pencil eraser) E= Evolving (New spots or one that is changing in size, shape, or color) A follow- up interval will be customized based on your history of skin cancer or level of skin damage and risk factors. In any case, if you notice a suspicious or new spot, an appointment should be arranged between regular visits. Everyone should use sunscreen and sun-safe practices, which is especially important for those with a personal or family history of skin cancer. Suggestions for this include: 1. Use daily moisturizers containing SPF 30 or higher. 2. Wear long sleeve clothing with UPF ratings and a broad-brimmed hat. 3. Apply sunscreen with SPF 30 or higher to all sun exposed areas if you are going to be in the sun. A broad spectrum UVA/ UVB sunscreen is best.  Dont forget to REAPPLY every two hours or more often if swimming or sweating! 4. Avoid outside activities during peak sun hours, especially in the summer (10am- 2pm). 5. DO NOT use tanning beds. Using sunscreen and sun-safe practices can help reduce the likelihood of developing skin cancer or additional skin cancers in those previously diagnosed. Introducing [facility]! Dear Angélica John: Thank you for requesting a [employer] account. Our records indicate that you already have an active [employer] account. You can access your account anytime at [email or web address] Did you know that you can access your hospital and ER discharge instructions at any time in [employer]?   You can also review all of your test results from your hospital stay or ER visit. Additional Information If you have questions, please visit the Frequently Asked Questions section of the [email or web address] website at [email or web address] Remember, [email or web address] is NOT to be used for urgent needs. For medical emergencies, dial 911. Now available from your iPhone and Android! Please provide this summary of care documentation to your next provider. Your primary care clinician is listed as Nancy Chavira. If you have any questions after today's visit, please call [phone]. No

## 2019-02-11 ENCOUNTER — OFFICE VISIT (OUTPATIENT)
Dept: DERMATOLOGY | Facility: AMBULATORY SURGERY CENTER | Age: 84
End: 2019-02-11

## 2019-02-11 ENCOUNTER — HOSPITAL ENCOUNTER (OUTPATIENT)
Dept: LAB | Age: 84
Discharge: HOME | End: 2019-02-11

## 2019-02-11 VITALS
OXYGEN SATURATION: 97 % | DIASTOLIC BLOOD PRESSURE: 84 MMHG | TEMPERATURE: 98.1 F | HEIGHT: 62 IN | RESPIRATION RATE: 16 BRPM | BODY MASS INDEX: 26.05 KG/M2 | SYSTOLIC BLOOD PRESSURE: 128 MMHG | WEIGHT: 141.54 LBS | HEART RATE: 61 BPM

## 2019-02-11 DIAGNOSIS — Z85.828 HISTORY OF NONMELANOMA SKIN CANCER: ICD-10-CM

## 2019-02-11 DIAGNOSIS — L82.1 SEBORRHEIC KERATOSES: ICD-10-CM

## 2019-02-11 DIAGNOSIS — D48.5 NEOPLASM OF UNCERTAIN BEHAVIOR OF SKIN OF CHEEK: ICD-10-CM

## 2019-02-11 DIAGNOSIS — D48.5 NEOPLASM OF UNCERTAIN BEHAVIOR OF SKIN OF CHIN: Primary | ICD-10-CM

## 2019-02-11 DIAGNOSIS — D48.5 NEOPLASM OF UNCERTAIN BEHAVIOR OF SKIN OF NOSE: ICD-10-CM

## 2019-02-11 DIAGNOSIS — L82.0 INFLAMED SEBORRHEIC KERATOSIS: ICD-10-CM

## 2019-02-11 NOTE — PROGRESS NOTES
Written by Mars Rodas, as dictated by Bri Gruber, [address]. Name: Parthenia Pallas       Age: 80 y.o. Date: 2019    Chief Complaint:   Chief Complaint   Patient presents with    Skin Exam       Subjective:    HPI  Ms. Parthenia Pallas is a 80 y.o. female who presents for a partial skin exam.  The patient's last skin exam was on 17 and the patient does have current complaints related to her skin. She reports bleeding lesions on the right cheek and nose. She also reports a bothersome lesion on the right upper eyelid. She is feeling well and in her usual state of health today. She has no current illnesses, no other skin concerns. Her allergies, medications, medical, and social history are reviewed by me today. The patient's pertinent skin history includes :   -BCC, right ala, Mohs, 17, Dr. Eamon Lamas  [facility], right nasal supratip, Mohs, 17, Dr. Knutson Adams  [facility], right postauricular, curettage, 17, Dr. Eamon Lamas  -20+ BCCs, face, Mohs by Dr. Ana Cuevas per pt    ROS: Constitutional: Negative.     Dermatological : positive for - skin lesion changes    Social History     Socioeconomic History    Marital status:      Spouse name: Not on file    Number of children: Not on file    Years of education: Not on file    Highest education level: Not on file   Social Needs    Financial resource strain: Not on file    Food insecurity - worry: Not on file    Food insecurity - inability: Not on file    Transportation needs - medical: Not on file   [employer] needs - non-medical: Not on file   Occupational History    Not on file   Tobacco Use    Smoking status: Former Smoker     Packs/day: 0.25     Years: 45.00     Pack years: 11.25     Last attempt to quit: 1993     Years since quittin.2    Smokeless tobacco: Never Used    Tobacco comment: 2-3 CIGARETTES PER DAY   Substance and Sexual Activity    Alcohol use: Yes     Comment: RARELY    Drug use: No    Sexual activity: Not Currently   Other Topics Concern    Not on file   Social History Narrative    Not on file       Family History   Problem Relation Age of Onset    Hypertension Mother     Diabetes Mother     Heart Failure Mother     Heart Disease Mother     Arthritis-osteo Mother     Stroke Father     Kidney Disease Father         DAMAGED KIDNEY AFTER A FALL    Arthritis-osteo Father     Cancer Sister         PANCREATIC AND LIVER    Anesth Problems Neg Hx        Past Medical History:   Diagnosis Date    Arthritis     Cancer ([facility] [address]     [facility] ON FACE    DDD (degenerative disc disease)     Diabetes mellitus type II 8/20/2010    NO MEDS CURRENTLY, PT DENIES (9/7/16)    Family history of skin cancer     GERD (gastroesophageal reflux disease) 4/27/2010    Heart murmur     History of blood transfusion [address], NO REACTION    Hyperlipidemia LDL goal < 100     Hyperlipidemia LDL goal < 100 4/27/2010    Hypertension     Impaired fasting glucose     Osteoporosis     Skin cancer     Numerous BCC on face and ears    Sun-damaged skin     Thyroid disease     NO MEDS CURRENTLY (9/7/16)       Past Surgical History:   Procedure Laterality Date    HX BACK SURGERY      SOME SURGERY ON LOWER BACK    HX BREAST BIOPSY      BENIGN, HAD FIBROCYST    HX CATARACT REMOVAL Bilateral     W/LENS IMPLANT    HX CHOLECYSTECTOMY      HX GI      ENDOSCOPY, STRETCHING OF ESOPHAGOUS    HX GI      COLONOSCOPY    HX HEART CATHETERIZATION      NO STENTS    HX HERNIA REPAIR  9/17/15    Laparoscopic paraesophageal hernia repair with mesh  by Dr Dionicio Lang  48/61/2266    lap umbilical hernia repair-[facility]-Dr. DALLIN Abdi    HX KNEE REPLACEMENT Left 11/25/2013    HX LAP CHOLECYSTECTOMY      HX OTHER SURGICAL      BCCA ON FACE    HX UROLOGICAL      ?  BLADDER TUCK    REMOVAL GALLBLADDER      TOTAL KNEE ARTHROPLASTY Right 1993       Current Outpatient Medications   Medication Sig Dispense Refill    traMADol (ULTRAM) 50 mg tablet Take 50 mg by mouth every six (6) hours as needed for Pain.  furosemide (LASIX) 20 mg tablet TK 1 T PO QD  5    omeprazole (PRILOSEC) 20 mg capsule Take 20 mg by mouth nightly. 9 PM      traZODone (DESYREL) 50 mg tablet Take 50 mg by mouth nightly.  metoprolol (LOPRESSOR) 25 mg tablet Take 12.5 mg by mouth two (2) times a day.  Cholestyramine-Aspartame (CHOLESTYRAMINE LIGHT) 4 gram powder Take  by mouth three (3) times daily (with meals).  acetaminophen (TYLENOL) 500 mg tablet Take 1,000 mg by mouth daily as needed for Pain.  cyanocobalamin (VITAMIN B-12) 1,000 mcg tablet Take 1,000 mcg by mouth daily. Allergies   Allergen Reactions    Latex Itching    Pcn [Penicillins] Anaphylaxis    Adhesive Tape-Silicones Itching and Other (comments)     REDNESS    Codeine Nausea and Vomiting         Objective:    Visit Vitals  /84 (BP 1 Location: Left arm, BP Patient Position: Sitting)   Pulse 61   Temp 98.1 °F (36.7 °C) (Oral)   Resp 16   Ht 5' 2\" (1.575 m)   Wt 141 lb 8.6 oz (64.2 kg)   SpO2 97%   BMI 25.89 kg/m²       Farhan Tsang is a [address] y.o. female who appears well and in no distress. She is awake, alert, and oriented. There is no preauricular, submandibular, or cervical lymphadenopathy. A skin examination was performed including her scalp, face (including eyelids), ears, neck. She has well-healed scars on the right ala, right nasal supratip, right preauricular, and multiple other scars on the face without evidence of lesion recurrence. There is a 3 mm pink scaly macule on the nasal dorsum,concering for basal cell carcinoma vs actinic keratosis. There is a 5 x 5 mm pink papule on the right cheek, most consistent with basal cell caricnoma. There is a 12 x 5 mm pink scaly macule on the right chin, concerning for basal cell carcinoma vs actinic keratosis vs squamous in situ.  There is a 5 x 5 mm pink scaly macule on the mid chin, concerning for basal cell carcinoma vs actinic keratosis. She has scattered waxy macules and keratotic papules consistent with seborrheic keratoses. There is an inflamed seborrheic keratosis on the right lateral canthus. There is a pink scaly patch on the right lateral forehead and right submandibular neck most consistent with dermatitis. Assessment/Plan:    1. Personal history of nonmelanoma skin cancer. I discussed sun protection, sunscreen use, the warning signs of skin cancer, the need for self-skin examinations, and the need for regular practitioner exams every 6 months. The patient should follow up sooner as needed if new, changing, or symptomatic skin lesions arise. 2. Neoplasm of Uncertain Behavior, nasal dorsum, R/O BCC vs AK. The differential diagnoses were discussed. A shave biopsy was advised to sample this lesion. The procedure was reviewed and verbal and written consent were obtained. The risks of pain, bleeding, infection, and scar were discussed. The patient is aware that this is a sample and is intended for diagnosis and not therapy of the skin lesion. I performed the procedure. The site was cleansed and anesthetized with 1% Lidocaine with Epinephrine 1:100,000. A shave biopsy was performed to sample the lesion. Drysol was used for hemostasis. The wound was bandaged and care reviewed. The specimen was sent to pathology. I will contact the patient with the results and any further treatment that may be necessary. 3. Neoplasm of Uncertain Behavior, right cheek, favor BCC. The differential diagnoses were discussed. A shave biopsy was advised to sample this lesion. The procedure was reviewed and verbal and written consent were obtained. The risks of pain, bleeding, infection, and scar were discussed. The patient is aware that this is a sample and is intended for diagnosis and not therapy of the skin lesion. I performed the procedure.   The site was cleansed and anesthetized with 1% Lidocaine with Epinephrine 1:100,000. A shave biopsy was performed to sample the lesion. Drysol was used for hemostasis. The wound was bandaged and care reviewed. The specimen was sent to pathology. I will contact the patient with the results and any further treatment that may be necessary. 4. Neoplasm of Uncertain Behavior, right chin, R/O BCC vs AK. The differential diagnoses were discussed. A shave biopsy was advised to sample this lesion. The procedure was reviewed and verbal and written consent were obtained. The risks of pain, bleeding, infection, and scar were discussed. The patient is aware that this is a sample and is intended for diagnosis and not therapy of the skin lesion. I performed the procedure. The site was cleansed and anesthetized with 1% Lidocaine with Epinephrine 1:100,000. A shave biopsy was performed to sample the lesion. Drysol was used for hemostasis. The wound was bandaged and care reviewed. The specimen was sent to pathology. I will contact the patient with the results and any further treatment that may be necessary. 5. Neoplasm of Uncertain Behavior, mid chin, R/O BCC vs AK. The differential diagnoses were discussed. A shave biopsy was advised to sample this lesion. The procedure was reviewed and verbal and written consent were obtained. The risks of pain, bleeding, infection, and scar were discussed. The patient is aware that this is a sample and is intended for diagnosis and not therapy of the skin lesion. I performed the procedure. The site was cleansed and anesthetized with 1% Lidocaine with Epinephrine 1:100,000. A shave biopsy was performed to sample the lesion. Drysol was used for hemostasis. The wound was bandaged and care reviewed. The specimen was sent to pathology. I will contact the patient with the results and any further treatment that may be necessary. 6. Seborrheic keratoses.   The diagnosis was reviewed and the patient was reassured that no treatment is needed for these benign lesions. 7. Inflamed seborrheic keratoses, right lateral canthus. The diagnosis and treatment with liquid nitrogen cryotherapy were reviewed. The risk or persistence or recurrence of the keratosis and the potential for pigment change at the treated site were reviewed. Pt would like to have lesion treated when she returns.      This plan was reviewed with the patient and patient agrees. All questions were answered. This scribe documentation was reviewed by me and accurately reflects the examination and decisions made by me. [facility]   OFFICE PROCEDURE PROGRESS NOTE   Chart reviewed for the following:   Samuel CHANEY, have reviewed the History, Physical and updated the Allergic reactions for [address]. TIME OUT performed immediately prior to start of procedure:   Vika CHANEY, have performed the following reviews on [address]   prior to the start of the procedure:     * Patient was identified by name and date of birth   * Agreement on procedure being performed was verified   * Risks and Benefits explained to the patient   * Procedure site verified and marked as necessary   * Patient was positioned for comfort   * Consent was signed and verified     Time: 10:10 AM  Date of procedure: 2/11/2019  Procedure performed by: Nikki Raya. Casandra Lopez  Provider assisted by:  Aleah Yeboah LPN   Patient assisted by: self   How tolerated by patient: tolerated the procedure well with no complications   Comments: none

## 2019-02-11 NOTE — PATIENT INSTRUCTIONS
Self Skin Exam and Sunscreens    Early detection and treatment is essential in the treatment of all forms of skin cancer. If caught early, all forms of skin cancer are curable. In addition to your regular visits, you should perform a monthly skin examination. Over time, you become familiar with what is normally found on your skin and can identify new or suspicious spots. One of the screening tools you can use to assess your skin is to follow the ABCDEs:    A= Asymmetry (One half is unlike the other half)     B= Border (An irregular, scalloped or poorly defined edge)    C= Color (Is varied from one area to another, has shades of tan, brown/ black, white, red or blue)    D= Diameter (Spots larger than 6mm or a pencil eraser)    E= Evolving (New spots or one that is changing in size, shape, or color)    A follow- up interval will be customized based on your history of skin cancer or level of skin damage and risk factors. In any case, if you notice a suspicious or new spot, an appointment should be arranged between regular visits. Everyone should use sunscreen and sun-safe practices, which is especially important for those with a personal or family history of skin cancer. Suggestions for this include:    1. Use daily moisturizers containing SPF 30 or higher. 2. Wear long sleeve clothing with UPF ratings and a broad-brimmed hat. 3. Apply sunscreen with SPF 30 or higher to all sun exposed areas if you are going to be in the sun. A broad spectrum UVA/ UVB sunscreen is best.  Dont forget to REAPPLY every two hours or more often if swimming or sweating! 4. Avoid outside activities during peak sun hours, especially in the summer (10am- 2pm). 5. DO NOT use tanning beds. Using sunscreen and sun-safe practices can help reduce the likelihood of developing skin cancer or additional skin cancers in those previously diagnosed.

## 2019-02-11 NOTE — PROGRESS NOTES
Chief Complaint   Patient presents with    Skin Exam     Pt is concerned about a few spots on right side of check and on nose that bleed. 1. Have you been to the ER, urgent care clinic since your last visit? Hospitalized since your last visit? No    2. Have you seen or consulted any other health care providers outside of the [facility] since your last visit? Include any pap smears or colon screening.  No

## 2019-02-14 NOTE — PROGRESS NOTES
I spoke with the patient and she states the areas are healing well on her face from the biopsies. She understands the nose and right cheek show BCC. The other two were benign and do not require further treatment. She is setting up Mohs surgery with Dr. Nataliia Clayton now.

## 2019-02-15 ENCOUNTER — TELEPHONE (OUTPATIENT)
Dept: DERMATOLOGY | Facility: AMBULATORY SURGERY CENTER | Age: 84
End: 2019-02-15

## 2019-02-15 NOTE — TELEPHONE ENCOUNTER
Mohs Pre-Op Assessment    Patient Appointment Date: 2/19/19 0815am     Elisa Solders, 80 y.o., female      does confirm site: right cheek and nasal dorsum  Brief description of tumor: bcc  does ID site. (Can they still visibly see the site)  does not have Hepatitis C   does not have HIV (If YES, set up consult appointment)    Allergies: Allergies   Allergen Reactions    Latex Itching    Pcn [Penicillins] Anaphylaxis    Adhesive Tape-Silicones Itching and Other (comments)     REDNESS    Bacitracin Itching and Swelling     Eye ointment - reaction in right eye    Codeine Nausea and Vomiting       does not have an Electrical Implanted Device (Pacemaker, AICD, brain stimulator, etc.)    does not know need antibiotics  If yes, antibiotic used:na; and needs it because na (valve replacement, etc.)  Can patient get antibiotic from primary care provider NO    is taking NSAIDs  If yes, is taking Aleve, and was asked to d/c 2 days prior to surgery and informed to resume taking 2 days after surgery. Patient can switch to a Tylenol based medication. is not taking aspirin  If yes, is taking because of na (i.e. heart attack, stroke, TIA, bypass surgery, etc.)    is not taking Garlic  is not taking Ginkgo  is not taking Ginseng  is not taking Fish oils  is not taking Vit E    does not take a blood thinner(i.e. Coumadin/Warfarin, Plavix, Brilinta, Pradaxa, Xarelto, Effient, Eliquis, Savaysa)  If taking Coumadin needs to have PT/INR drawn and faxed results within a week of surgery    does not have a blood disorder (CLL, AML, PV)  is not on Meds for blood disorder, pt on:  is not on Chemo for blood disorder    has not had a organ transplant  has not had a bone marrow transplant      Pre operative assessment questions asked to patient.   Patient has a general understanding of the procedure, and has been versed that there will be local anesthesia used in the procedure and that She will be ok to drive themselves to and from the appointment. Patient has been notified to arrive 15-20 minutes early and they may eat or drink before arriving.

## 2019-02-18 ENCOUNTER — OFFICE VISIT (OUTPATIENT)
Dept: DERMATOLOGY | Facility: AMBULATORY SURGERY CENTER | Age: 84
End: 2019-02-18

## 2019-02-18 VITALS
OXYGEN SATURATION: 95 % | SYSTOLIC BLOOD PRESSURE: 132 MMHG | TEMPERATURE: 97.6 F | RESPIRATION RATE: 16 BRPM | DIASTOLIC BLOOD PRESSURE: 74 MMHG | HEIGHT: 62 IN | WEIGHT: 141 LBS | HEART RATE: 70 BPM | BODY MASS INDEX: 25.95 KG/M2

## 2019-02-18 DIAGNOSIS — L82.1 SEBORRHEIC KERATOSIS: ICD-10-CM

## 2019-02-18 DIAGNOSIS — C44.311 BASAL CELL CARCINOMA (BCC) OF DORSUM OF NOSE: ICD-10-CM

## 2019-02-18 DIAGNOSIS — D48.5 NEOPLASM OF UNCERTAIN BEHAVIOR OF SKIN OF UPPER EXTREMITY: ICD-10-CM

## 2019-02-18 DIAGNOSIS — C44.319 BASAL CELL CARCINOMA (BCC) OF RIGHT CHEEK: Primary | ICD-10-CM

## 2019-02-18 NOTE — PATIENT INSTRUCTIONS
Chief Complaint   Patient presents with    Other     Mohs procedure     WOUND CARE INSTRUCTIONS    1. Keep the dressing clean and dry and do not remove for 48 hours. 2. Then change the dressing once a day as follows:  a. Wash hands before and after each dressing change. b. Remove dressing and wash site gently with mild soap and water, rinse, and pat dry.  c. Apply an ointment Vaseline or Aquaphor  d. Apply a non-stick (Telfa) dressing or Band-Aid to cover the wound. 3. Watch for:  BLEEDING: A small amount of drainage may occur. If bleeding occurs, elevate and rest the surgery site. Apply gauze and steady pressure for 15 minutes. If bleeding continues, call this office. INFECTION: Signs of infection include increased redness, pain, warmth, drainage of pus, and fever. If this occurs, call this office. 4. Special Instructions (follow any that are checked):  · [x] You have stitches that DO need to be removed. · [x] Avoid bending at the waist and heavy lifting for two days. · [x] Sleep with your head elevated for the next two nights. · [x] Rest the surgery site and keep it elevated as much as possible for two days. · [x] You may apply an ice-pack for 10-15 minutes every waking hour for the rest of the day. · [x] Eat a soft diet and avoid hot food and hot drinks for the rest of the day. · [] Other instructions: Follow up as directed. 1 week  Take Tylenol or Ibuprofen for pain as needed. Once the site is healed with no remaining bandages or open areas, protect your surgical site and scar from the sun, as this area will be more sensitive. Use a broad spectrum sunscreen SPF 30 or higher daily, and a chemical free product (one containing zinc oxide or titanium dioxide) is a good choice if the area is sensitive. You may begin to gently massage the surgical site in 2-3 weeks, rubbing in a circular motion along the scar. This can help reduce swelling and thickness of a scar.  A scar cream may be used beginnning 1 month after the surgery. If you have any questions or concerns, please call our office Monday through Friday at [phone]. You can also contact Dr. Jennifer Farnco directly for emergency purposes at [phone].

## 2019-02-18 NOTE — PROGRESS NOTES
Shahbaz Lopez is a 80 y.o. 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 Both lesions have been present for an unspecified amount of time and have been asymptomatic. The lesion on the cheek is been biopsied but never treated. That on the nasal dorsum is near several old surgical scars. The patient also points out a lesion on the right wrist that is been present for several months and is somewhat tender. This lesion has been growing slowly. It is never been biopsied or treated. She also points out a lesion on her right hand that has been present for many years and also seems to be growing. This lesion is asymptomatic. ROS    Physical Exam   Constitutional: She is oriented to person, place, and time and well-developed, well-nourished, and in no distress. HENT:   Head: Normocephalic. Eyes: EOM are normal.   Pulmonary/Chest: Effort normal.   Neurological: She is alert and oriented to person, place, and time. Skin:   On the right cheek there is a crusted pink pearly papule. On the nasal dorsum there is a crusted pink papule adjacent to several faint surgical scars. 1. Basal cell carcinoma (BCC) of right cheek                  Indications, risks, and options were discussed with Ms. Babb preoperatively. Risks including, but not limited to: pain, bleeding, infection, tumor recurrence, scarring and damage to motor and/or sensory nerves, were discussed. Ms. Ned Bojorquez chose Mohs surgery. Ms. Ned Bojorquez was an acceptable surgery candidate. Ms. Ned Bojorquez was placed in the appropriate position on the operating table in the Mohs surgery procedure room. The area was prepped and draped in the standard manner. Gentian violet was used to outline the clinical margins of the tumor. Local anesthesia was then obtained. The grossly visible tumor was then removed, an underlying layer was excised and mapped according to the Mohs technique, and the individual specimens examined microscopically. The process was repeated until microscopic examination of the tissue specimens confirmed a tumor-free plane. Hemostasis was obtained with electrosurgery and pressure. The wound was covered between stages with moist saline gauze. 1 stages were required to achieve a tumor-free plane, resulting in a 1.6 x 1.1 cm defect penetrating to the subcutaneous fat. The wound management options of second intent healing, layered closure, local flap, and/or full thickness skin graft were discussed. Ms. Fabiana Morris understands the aims, risks, alternatives, and possible complications and elects to proceed with a complex layered closure. Wound margins were debeveled, edges widely undermined in the subcutaneous plane, and standing cones corrected at both poles followed by complex layered closure. The wound was closed with buried 5-0 monocryl suture in the muscle and deep subcutis to reduce width of the wound and a second layer in the dermis to reduce tension on the skin edges with careful attention to edge apposition and eversion for optimal cosmesis. Epidermal edges were carefully approximated with 5-0 monocryl suture, again with careful attention to apposition and eversion. The final closure length was Closure Length: 4.5 centimeters. The wound was bandaged with Petrolatum ointment, Telfa, gauze and Coverroll. Wound care instructions (written and/or verbal) and a follow up appointment were given to Ms. Fabiana Morris before discharge. Ms. Fabiana Morris was discharged in good condition.                  2. Basal cell carcinoma (BCC) of dorsum of nose                  Indications, risks, and options were discussed with MsAnalilia Babb preoperatively. Risks including, but not limited to: pain, bleeding, infection, tumor recurrence, scarring and damage to motor and/or sensory nerves, were discussed. Ms. Daja Herbert chose Mohs surgery. Ms. Daja Herbert was an acceptable surgery candidate. Ms. Daja Herbert was placed in the appropriate position on the operating table in the Mohs surgery procedure room. The area was prepped and draped in the standard manner. Gentian violet was used to outline the clinical margins of the tumor. Local anesthesia was then obtained. The grossly visible tumor was then removed, an underlying layer was excised and mapped according to the Mohs technique, and the individual specimens examined microscopically. The process was repeated until microscopic examination of the tissue specimens confirmed a tumor-free plane. Hemostasis was obtained with electrosurgery and pressure. The wound was covered between stages with moist saline gauze. 1 stages were required to achieve a tumor-free plane, resulting in a 1.1 x 1.0 cm defect penetrating to the dermis. Given the superficial nature of this defect and the need for further monitoring given its proximity to old surgical scars the decision was made to allow the wound to heal by second intent. Hemostasis was obtained with electrosurgery, and a pressure bandage was applied. The patient was given wound care instructions including my cell phone number and will follow up next week for wound check. 3. 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 I think the lesion is most likely to be a squamous cell carcinoma and I recommend excision at next visit with conservative margins for treatment and diagnosis. The patient agrees with this plan.     4.  Seborrheic keratosis-right dorsal hand  Seborrheic keratoses:    Seborrheic keratoses. The diagnosis was reviewed and the patient was reassured that no treatment is needed for these benign lesions. Follow-up Disposition:  Return in about 1 week (around 2/25/2019) for Suture removal.    Adair Velasquez MD     [address]   OFFICE PROCEDURE PROGRESS NOTE   Chart reviewed for the following:   Nohemi Henry MD have reviewed the History, Physical and updated the Allergic reactions for Nicholas Betancourt. TIME OUT performed immediately prior to start of procedure:   Nohemi Henry MD, have performed the following reviews on Nicholas Betancourt   prior to the start of the procedure:     * Patient was identified by name and date of birth   * Agreement on procedure being performed was verified   * Risks and Benefits explained to the patient   * Procedure site verified and marked as necessary   * Patient was positioned for comfort   * Consent was signed and verified     Time: 8:15 AM  Date of procedure: 2/18/2019  Procedure performed by:  Irene Nagy MD  Provider assisted by: Alonso Parish RN  Patient assisted by: self   How tolerated by patient: tolerated the procedure well with no complications   Comments: none

## 2019-02-26 ENCOUNTER — HOSPITAL ENCOUNTER (OUTPATIENT)
Dept: LAB | Age: 84
Discharge: HOME | End: 2019-02-26

## 2019-02-26 ENCOUNTER — OFFICE VISIT (OUTPATIENT)
Dept: DERMATOLOGY | Facility: AMBULATORY SURGERY CENTER | Age: 84
End: 2019-02-26

## 2019-02-26 VITALS
TEMPERATURE: 98 F | SYSTOLIC BLOOD PRESSURE: 142 MMHG | BODY MASS INDEX: 25.95 KG/M2 | OXYGEN SATURATION: 98 % | DIASTOLIC BLOOD PRESSURE: 88 MMHG | WEIGHT: 141 LBS | HEIGHT: 62 IN | HEART RATE: 61 BPM | RESPIRATION RATE: 16 BRPM

## 2019-02-26 DIAGNOSIS — D48.5 NEOPLASM OF UNCERTAIN BEHAVIOR OF SKIN OF UPPER EXTREMITY: Primary | ICD-10-CM

## 2019-02-26 NOTE — PATIENT INSTRUCTIONS
WOUND CARE INSTRUCTIONS    1. Keep the dressing clean and dry and do not remove for 48 hours. 2. Then change the dressing once a day as follows:  a. Wash hands before and after each dressing change. b. Remove dressing and wash site gently with mild soap and water, rinse, and pat dry.  c. Apply an ointment (Bacitracin, Polysporin, Neosporin, Petroleum jelly or Aquaphor). d. Apply a non-stick (Telfa) dressing or Band-Aid to cover the wound. 3. Watch for:  BLEEDING: A small amount of drainage may occur. If bleeding occurs, elevate and rest the surgery site. Apply gauze and steady pressure for 15 minutes. If bleeding continues, call this office. INFECTION: Signs of infection include increased redness, pain, warmth, drainage of pus, and fever. If this occurs, call this office. 4. Special Instructions (follow any that are checked):  · [x] You have stitches that DO need to be removed. · [] Avoid bending at the waist and heavy lifting for two days. · [] Sleep with your head elevated for the next two nights. · [x] Rest the surgery site and keep it elevated as much as possible for two days. · [x] You may apply an ice-pack for 10-15 minutes every waking hour for the rest of the day. · [] Eat a soft diet and avoid hot food and hot drinks for the rest of the day. · [] Other instructions: Follow up as directed. Take Tylenol or Ibuprofen for pain as needed. Once the site is healed with no remaining bandages or open areas, protect your surgical site and scar from the sun, as this area will be more sensitive. Use a broad spectrum sunscreen SPF 30 or higher daily, and a chemical free product (one containing zinc oxide or titanium dioxide) is a good choice if the area is sensitive. You may begin to gently massage the surgical site in 2-3 weeks, rubbing in a circular motion along the scar. This can help reduce swelling and thickness of a scar.  A scar cream may be used beginnning 1 month after the surgery. If you have any questions or concerns, please call our office Monday through Friday at [phone]. You can also contact Dr. Colby Nunez directly for emergency purposes at [phone].

## 2019-02-26 NOTE — PROGRESS NOTES
Ladi Rivera is a 80 y.o. female presents to the office today with the following:  Chief Complaint   Patient presents with    Surgical Follow-up     stitch removal     Other     excision       27-year-old  female presents for excision of likely squamous cell carcinoma on the right forearm as well as suture removal from the right cheek. She has had no problems with her surgical site since her most surgery last week. Her nose is also continuing to heal well with liberal application of Vaseline. There has been no change in the lesion on the right forearm since last week. This lesion has been present for several months and has never been biopsied or treated. Physical Exam   Constitutional: She is oriented to person, place, and time and well-developed, well-nourished, and in no distress. HENT:   Head: Normocephalic. Eyes: EOM are normal.   Pulmonary/Chest: Effort normal.   Neurological: She is alert and oriented to person, place, and time. Skin:   On the right forearm there is a yellow keratotic plaque. On the right cheek there is a well-healed surgical scar with intact sutures. On the nasal dorsum there is a shallow granulating wound. 1. 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 The excision procedure was reviewed and verbal and written consents were obtained. The risks of pain, bleeding, infection, recurrence of the lesion, and scar were discussed. I performed the procedure. The site was cleansed and anesthetized with 1% lidocaine with epinephrine 1:100,000. The lesion was excised with a 4 mm margin in an elliptical manner to a depth of subcutaneous tissue. A intermediate primary closure was performed after the excision using 4-0 vicryl buried vertical mattress sutures and 5-0 ethilon epidermal sutures. The closure length was 5.3 cm. The wound was bandaged and care reviewed. The specimen was sent to pathology. I will contact the patient with the results and any further treatment that may be necessary.               Follow-up Disposition:  Return in about 2 weeks (around 3/12/2019) for suture removal with Dr. Leroy Zavala removal.    Dian Reeves MD

## 2019-02-28 ENCOUNTER — TELEPHONE (OUTPATIENT)
Dept: DERMATOLOGY | Facility: AMBULATORY SURGERY CENTER | Age: 84
End: 2019-02-28

## 2019-02-28 NOTE — TELEPHONE ENCOUNTER
10:57 AM  RN spoke with pt's daughter in law who had questions about the pt's bandages. Pt had removed pressure dressing from surgical site less than 48 hours post Mohs, pt was not experiencing active bleeding. RN directed pts daughter in law on wound care and assured her that as long as the pt was not experiencing bleeding that she did not need to come into the office for new bandages. Pt's daughter in law expressed understanding and reiterated that she had the practice and Dr. Harris Thatcher phone numbers if she needed to reach us again.

## 2019-02-28 NOTE — PROGRESS NOTES
Left voicemail for patient regarding results of excisional biopsy from the right forearm. Pathology showed a seborrheic keratosis. This is a benign diagnosis and needs no further treatment.

## 2019-03-11 ENCOUNTER — OFFICE VISIT (OUTPATIENT)
Dept: DERMATOLOGY | Facility: AMBULATORY SURGERY CENTER | Age: 84
End: 2019-03-11

## 2019-03-11 VITALS
TEMPERATURE: 98.2 F | DIASTOLIC BLOOD PRESSURE: 82 MMHG | BODY MASS INDEX: 25.95 KG/M2 | RESPIRATION RATE: 18 BRPM | HEIGHT: 62 IN | HEART RATE: 62 BPM | SYSTOLIC BLOOD PRESSURE: 140 MMHG | OXYGEN SATURATION: 95 % | WEIGHT: 141 LBS

## 2019-03-11 DIAGNOSIS — D48.5 NEOPLASM OF UNCERTAIN BEHAVIOR OF SKIN OF UPPER EXTREMITY: ICD-10-CM

## 2019-03-11 DIAGNOSIS — C44.311 BASAL CELL CARCINOMA (BCC) OF DORSUM OF NOSE: ICD-10-CM

## 2019-03-11 DIAGNOSIS — C44.319 BASAL CELL CARCINOMA (BCC) OF RIGHT CHEEK: ICD-10-CM

## 2019-03-11 DIAGNOSIS — H01.119 ALLERGIC CONTACT DERMATITIS OF EYELID: Primary | ICD-10-CM

## 2019-03-11 RX ORDER — DESONIDE 0.5 MG/G
CREAM TOPICAL 2 TIMES DAILY
Qty: 15 G | Refills: 0 | Status: SHIPPED | OUTPATIENT
Start: 2019-03-11 | End: 2019-03-25

## 2019-03-11 NOTE — PROGRESS NOTES
Patient presents for suture removal. The wound is well healed without signs of infection. The sutures are removed. Wound care and activity instructions given. Return prn.     Armando France LPN

## 2019-03-11 NOTE — PROGRESS NOTES
Lana Craig is a 80 y.o. female presents to the office today with the following:  Chief Complaint   Patient presents with    Skin Exam     suture removal right forearm       80-year-old  female recently status post Mohs surgery for biopsy-proven basal cell carcinoma of the right cheek and nasal dorsum as well as excisional biopsy of a neoplasm on the right forearm presents for follow-up. She notes that she has not had any problems with any of the surgical sites, however she notices that her right cheek has a pronounced melolabial fold in the left cheek. She also notes a small knot under her right cheek surgical site. She also notes that about 6 weeks ago she was given a bacitracin ointment to apply to her eyes after a procedure. Since that time she has had significant itching and swelling of the lid skin around her eyes. She has stopped applying bacitracin and has been applying Vaseline to her entire face. She notes that the majority of her facial skin is significantly drier than it has been in the past.        Physical Exam   Constitutional: She is oriented to person, place, and time and well-developed, well-nourished, and in no distress. HENT:   Head: Normocephalic. Eyes: EOM are normal.   Pulmonary/Chest: Effort normal.   Neurological: She is alert and oriented to person, place, and time. Skin:   On the right forearm there is a healing surgical scar that is dry and intact. Sutures were removed today. On the right cheek there is a well-healed surgical scar. On the dorsal nose there is a surgical wound that is granulating. There is more fullness of the right melolabial fold than the left. There is a surgical scar of the left melolabial fold. The bilateral lower eyelids and surrounding skin show market edema, erythema and hyper linearity.        1. Allergic contact dermatitis of eyelid  The likely diagnosis is allergic contact dermatitis of the eyelid, likely associated with the bacitracin ointment. Alternative causes may include soaps, cosmetics, nail polish. It is reasonable to pursue a 2-week trial of desonide cream applied twice daily to the affected areas. The patient was counseled to avoid getting the medication in her eyes. - desonide (TRIDESILON) 0.05 % cream; Apply  to affected area two (2) times a day for 14 days. Dispense: 15 g; Refill: 0    2. Basal cell carcinoma (BCC) of right cheek  The surgical site is healing well, although there is a very small standing cone at the superior end of the scar that may be contributing to the fullness of her melolabial fold. The patient was encouraged to continue to massage the scar in order to allow it to soften. If the patient is still dissatisfied in 3-4 months we can excise the standing cone. 3. Basal cell carcinoma (BCC) of dorsum of nose  This lesion is continuing to heal well, although the patient was counseled to increase the number of times she is applying Vaseline daily. 4. Neoplasm of uncertain behavior of skin of upper extremity  The surgical site is healing well and the sutures were removed today. Follow-up Disposition:  Return in about 2 weeks (around 3/25/2019) for Wound check.     Rosaline Lainez MD

## 2019-03-27 ENCOUNTER — OFFICE VISIT (OUTPATIENT)
Dept: DERMATOLOGY | Facility: AMBULATORY SURGERY CENTER | Age: 84
End: 2019-03-27

## 2019-03-27 VITALS
OXYGEN SATURATION: 94 % | WEIGHT: 141 LBS | BODY MASS INDEX: 25.95 KG/M2 | TEMPERATURE: 98.5 F | HEART RATE: 66 BPM | DIASTOLIC BLOOD PRESSURE: 76 MMHG | SYSTOLIC BLOOD PRESSURE: 128 MMHG | HEIGHT: 62 IN

## 2019-03-27 DIAGNOSIS — C44.311 BASAL CELL CARCINOMA (BCC) OF DORSUM OF NOSE: ICD-10-CM

## 2019-03-27 DIAGNOSIS — C44.319 BASAL CELL CARCINOMA (BCC) OF RIGHT CHEEK: Primary | ICD-10-CM

## 2019-03-27 RX ORDER — NIFEDIPINE 30 MG/1
TABLET, EXTENDED RELEASE ORAL
Refills: 3 | COMMUNITY
Start: 2019-02-07

## 2020-01-10 ENCOUNTER — OFFICE VISIT (OUTPATIENT)
Dept: DERMATOLOGY | Facility: AMBULATORY SURGERY CENTER | Age: 85
End: 2020-01-10

## 2020-01-10 VITALS
BODY MASS INDEX: 25.76 KG/M2 | WEIGHT: 140 LBS | HEIGHT: 62 IN | RESPIRATION RATE: 16 BRPM | TEMPERATURE: 98.1 F | SYSTOLIC BLOOD PRESSURE: 120 MMHG | HEART RATE: 62 BPM | DIASTOLIC BLOOD PRESSURE: 70 MMHG | OXYGEN SATURATION: 95 %

## 2020-01-10 DIAGNOSIS — L21.9 SEBORRHEIC DERMATITIS: ICD-10-CM

## 2020-01-10 DIAGNOSIS — H00.012 HORDEOLUM OF RIGHT LOWER EYELID, UNSPECIFIED HORDEOLUM TYPE: Primary | ICD-10-CM

## 2020-01-10 DIAGNOSIS — Z85.828 HISTORY OF NONMELANOMA SKIN CANCER: ICD-10-CM

## 2020-01-10 DIAGNOSIS — L72.0 MILIAL CYST: ICD-10-CM

## 2020-01-10 NOTE — PROGRESS NOTES
Written by Doug Andrews, as dictated by Alexei Bah, [address]. Name: Demario        Age: 80 y.o. Date: 1/10/2020    Chief Complaint:   Chief Complaint   Patient presents with    Skin Exam       Subjective:    HPI:  Ms.. Hanks  is a 80 y.o. female who presents for the evaluation of a lesion on the right lower eyelid. She states that the lesion appeared a couple of weeks ago. The patient states that she was concerned about the lesion due to a history of skin cancer on the eyelid in the past. She says that she thought it was a stye and it gets better at times but won't fully heal. She admits that she does wake up with crust on her eyelids sometimes. She is also concerned about a small lesion that she noticed on the right lower cheek recently. She denies any symptoms from this lesion. She is feeling well and in her usual state of health today. She has no current illnesses, no other skin concerns. Her allergies, medications, medical, and social history are reviewed by me today.     The patient's pertinent skin history includes :   -BCC, right nasal dorsum, Mohs, 2/18/19  -BCC, right cheek, Mohs, 2/18/19  -BCC, right ala, Mohs, 01/05/17, [facility], right nasal supratip, Mohs, 01/05/17, [facility], right postauricular, curettage, 01/05/17, Dr. Lucio Meza  -20+ BCCs, face, Mohs by Dr. Carter Hartman per pt     ROS: Consitutional: Negative  Dermatological : positive for - skin lesion changes    Social History     Socioeconomic History    Marital status:      Spouse name: Not on file    Number of children: Not on file    Years of education: Not on file    Highest education level: Not on file   Occupational History    Not on file   Social Needs    Financial resource strain: Not on file    Food insecurity:     Worry: Not on file     Inability: Not on file    Transportation needs:     Medical: Not on file     Non-medical: Not on file   Tobacco Use    Smoking status: Former Smoker     Packs/day: 0.25     Years: 45.00     Pack years: 11.25     Last attempt to quit: 1993     Years since quittin.1    Smokeless tobacco: Never Used    Tobacco comment: 2-3 CIGARETTES PER DAY   Substance and Sexual Activity    Alcohol use: Yes     Comment: RARELY    Drug use: No    Sexual activity: Not Currently   Lifestyle    Physical activity:     Days per week: Not on file     Minutes per session: Not on file    Stress: Not on file   Relationships    Social connections:     Talks on phone: Not on file     Gets together: Not on file     Attends Cheondoism service: Not on file     Active member of club or organization: Not on file     Attends meetings of clubs or organizations: Not on file     Relationship status: Not on file    Intimate partner violence:     Fear of current or ex partner: Not on file     Emotionally abused: Not on file     Physically abused: Not on file     Forced sexual activity: Not on file   Other Topics Concern    Not on file   Social History Narrative    Not on file       Family History   Problem Relation Age of Onset    Hypertension Mother     Diabetes Mother     Heart Failure Mother     Heart Disease Mother     Arthritis-osteo Mother     Stroke Father     Kidney Disease Father         DAMAGED KIDNEY AFTER A FALL    Arthritis-osteo Father     Cancer Sister         PANCREATIC AND LIVER    Anesth Problems Neg Hx        Past Medical History:   Diagnosis Date    Arthritis     Cancer ([facility] [address]     [address] ON F[address]    DDD (degenerative disc disease)     Diabetes mellitus type II 2010    NO MEDS CURRENTLY, PT DENIES (16)    Family history of skin cancer     GERD (gastroesophageal reflux disease) 2010    Heart murmur     History of blood transfusion [address] NO REACTION    Hyperlipidemia LDL goal < 100     Hyperlipidemia LDL goal < 100 2010    Hypertension     Impaired fasting glucose     Osteoporosis     Skin cancer     Numerous BCC on face and ears    Sun-damaged skin     Thyroid disease     NO MEDS CURRENTLY (9/7/16)       Past Surgical History:   Procedure Laterality Date    HX BACK SURGERY      SOME SURGERY ON LOWER BACK    HX BREAST BIOPSY      BENIGN, HAD FIBROCYST    HX CATARACT REMOVAL Bilateral     W/LENS IMPLANT    HX CHOLECYSTECTOMY      HX GI      ENDOSCOPY, STRETCHING OF ESOPHAGOUS    HX GI      COLONOSCOPY    HX HEART CATHETERIZATION      NO STENTS    HX HERNIA REPAIR  9/17/15    Laparoscopic paraesophageal hernia repair with mesh  by Dr Geremias Neil  99/29/7462    lap umbilical hernia repair-[facility]-Dr. DALLIN Abdi    HX KNEE REPLACEMENT Left 11/25/2013    HX LAP CHOLECYSTECTOMY      HX OTHER SURGICAL      BCCA ON FACE    HX UROLOGICAL      ? BLADDER TUCK    REMOVAL GALLBLADDER      TOTAL KNEE ARTHROPLASTY Right 1993       Current Outpatient Medications   Medication Sig Dispense Refill    NIFEdipine ER (PROCARDIA XL) 30 mg ER tablet TK 1 T PO D  3    traMADol (ULTRAM) 50 mg tablet Take 50 mg by mouth every six (6) hours as needed for Pain.  Cholestyramine-Aspartame (CHOLESTYRAMINE LIGHT) 4 gram powder Take  by mouth three (3) times daily (with meals).  acetaminophen (TYLENOL) 500 mg tablet Take 1,000 mg by mouth daily as needed for Pain.  furosemide (LASIX) 20 mg tablet TK 1 T PO QD  5    cyanocobalamin (VITAMIN B-12) 1,000 mcg tablet Take 1,000 mcg by mouth daily.  omeprazole (PRILOSEC) 20 mg capsule Take 20 mg by mouth nightly. 9 PM      traZODone (DESYREL) 50 mg tablet Take 50 mg by mouth nightly.  metoprolol (LOPRESSOR) 25 mg tablet Take 12.5 mg by mouth two (2) times a day.          Allergies   Allergen Reactions    Latex Itching    Pcn [Penicillins] Anaphylaxis    Adhesive Tape-Silicones Itching and Other (comments)     REDNESS    Bacitracin Itching and Swelling     Eye ointment - reaction in right eye    Codeine Nausea and Vomiting Objective:    Visit Vitals  /70 (BP 1 Location: Left arm, BP Patient Position: Sitting)   Pulse 62   Temp 98.1 °F (36.7 °C) (Oral)   Resp 16   Ht 5' 2\" (1.575 m)   Wt 63.5 kg (140 lb)   SpO2 95%   BMI 25.61 kg/m²       Derek Gandara is a 80 y.o. female who appears well and in no distress. She is awake, alert, and oriented. A limited skin examination was completed including the face (including eyelids). She has well healed scars on the nose and right cheek without evidence of lesion recurrence. She has pink scaly skin on the left side of her face consistent with dermatitis. She has 1 mm white papules most consistent with a milia cyst on the right lower cheek and forehead, including the area of her concern. There is a erythema and a papule that extends on to the inner aspect of the eyelid on the left lower eyelid, medial canthus most consistent with hordelum, including the area of her concern. Assessment/Plan:  1. Personal history of nonmelanoma skin cancer. I discussed sun protection, sunscreen use, the warning signs of skin cancer, the need for self-skin examinations, and the need for regular practitioner exams. The patient should follow up sooner as needed if new, changing, or symptomatic skin lesions arise. 2. Hordelum. The patient was referred to ocular plastics (Dr. Lillie Serrano) for treatment. I instructed her to use warm compresses. 3. Milia. The diagnosis was discussed. The patient was reassured that no treatment is necessary at this time. 4. Dermatitis. The diagnosis was discussed. The patient reports that these symptoms are being managed by medical dermatology. This plan was reviewed with the patient and patient agrees. All questions were answered. This scribe documentation was reviewed by me and accurately reflects the examination and decisions made by me.

## 2023-04-19 ENCOUNTER — HOSPITAL ENCOUNTER (OUTPATIENT)
Dept: GENERAL RADIOLOGY | Age: 88
Discharge: HOME | End: 2023-04-19
Attending: SURGERY
Payer: MEDICARE

## 2023-04-19 DIAGNOSIS — R13.19 ESOPHAGEAL DYSPHAGIA: ICD-10-CM

## 2023-04-19 PROCEDURE — 74240 X-RAY XM UPR GI TRC 1CNTRST: CPT

## 2023-04-21 ENCOUNTER — OFFICE VISIT (OUTPATIENT)
Dept: SURGERY | Age: 88
End: 2023-04-21
Payer: MEDICARE

## 2023-04-21 VITALS
RESPIRATION RATE: 18 BRPM | SYSTOLIC BLOOD PRESSURE: 136 MMHG | HEART RATE: 69 BPM | OXYGEN SATURATION: 93 % | WEIGHT: 131 LBS | TEMPERATURE: 98.5 F | BODY MASS INDEX: 24.11 KG/M2 | DIASTOLIC BLOOD PRESSURE: 74 MMHG | HEIGHT: 62 IN

## 2023-04-21 DIAGNOSIS — K44.9 HIATAL HERNIA: Primary | ICD-10-CM

## 2023-04-21 PROCEDURE — 1123F ACP DISCUSS/DSCN MKR DOCD: CPT | Performed by: SURGERY

## 2023-04-21 PROCEDURE — 1101F PT FALLS ASSESS-DOCD LE1/YR: CPT | Performed by: SURGERY

## 2023-04-21 PROCEDURE — G8427 DOCREV CUR MEDS BY ELIG CLIN: HCPCS | Performed by: SURGERY

## 2023-04-21 PROCEDURE — 99213 OFFICE O/P EST LOW 20 MIN: CPT | Performed by: SURGERY

## 2023-04-21 PROCEDURE — G8420 CALC BMI NORM PARAMETERS: HCPCS | Performed by: SURGERY

## 2023-04-21 PROCEDURE — G8536 NO DOC ELDER MAL SCRN: HCPCS | Performed by: SURGERY

## 2023-04-21 PROCEDURE — G8510 SCR DEP NEG, NO PLAN REQD: HCPCS | Performed by: SURGERY

## 2023-04-21 PROCEDURE — 1090F PRES/ABSN URINE INCON ASSESS: CPT | Performed by: SURGERY

## 2023-04-21 NOTE — PROGRESS NOTES
Identified pt with two pt identifiers (name and ). Reviewed chart in preparation for visit and have obtained necessary documentation. Meaghan Factor is a 80 y.o. female  Chief Complaint   Patient presents with    Results     S/P EGD review results. Visit Vitals  /74 (BP 1 Location: Right upper arm, BP Patient Position: Sitting, BP Cuff Size: Adult)   Pulse 69   Temp 98.5 °F (36.9 °C) (Oral)   Resp 18   Ht 5' 2\" (1.575 m)   Wt 131 lb (59.4 kg)   SpO2 93%   BMI 23.96 kg/m²       1. Have you been to the ER, urgent care clinic since your last visit? Hospitalized since your last visit? No    2. Have you seen or consulted any other health care providers outside of the [address] [place] [address] since your last visit? Include any pap smears or colon screening.  No

## 2023-04-21 NOTE — PROGRESS NOTES
Surgery Progress Note    4/21/2023    CC: Emesis    Subjective:     Patient following up after her upper GI. Upper GI findings demonstrated recurrent hiatal hernia without obstruction. 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 Patient has been on a liquid and puréed diet. No issues with emesis. Denies any significant GERD. Takes PPI control her symptoms. Constitutional: No fever or chills  Neurologic: No headache  Eyes: No scleral icterus or irritated eyes  Nose: No nasal pain or drainage  Mouth: No oral lesions or sore throat  Cardiac: No palpations or chest pain  Pulmonary: No cough or shortness of breath  Gastrointestinal: No nausea, emesis, diarrhea, or constipation  Genitourinary: No dysuria  Musculoskeletal: No muscle or joint tenderness  Skin: No rashes or lesions  Psychiatric: No anxiety or depressed mood    Objective:   Visit Vitals  /74 (BP 1 Location: Right upper arm, BP Patient Position: Sitting, BP Cuff Size: Adult)   Pulse 69   Temp 98.5 °F (36.9 °C) (Oral)   Resp 18   Ht 5' 2\" (1.575 m)   Wt 131 lb (59.4 kg)   SpO2 93%   BMI 23.96 kg/m²       General: No acute distress, conversant  Eyes: PERRLA, no scleral icterus  HENT: Normocephalic without oral lesions  Neck: Trachea midline without LAD  Cardiac: Normal pulse rate and rhythm  Pulmonary: Symmetric chest rise with normal effort  GI: Soft, NT, ND, no hernia, no splenomegaly  Skin: Warm without rash  Extremities: No edema or joint stiffness  Psych: Appropriate mood and affect    Assessment:     80year-old female recurrent hiatal hernia but with adequate GERD control and no signs of dysphagia    Plan:     Upper GI reviewed. Patient appears to be doing okay. Recommend soft diet going forward. Continue PPIs. Do not feel the risk and benefits of revision is in her favor. Patient and son expressed understanding and will contact me if her symptoms worsen.       Cesia Guerra MD  Bariatric and General Surgeon  [facility]

## 2023-08-12 ENCOUNTER — APPOINTMENT (OUTPATIENT)
Facility: HOSPITAL | Age: 88
End: 2023-08-12
Payer: MEDICARE

## 2023-08-12 ENCOUNTER — HOSPITAL ENCOUNTER (EMERGENCY)
Facility: HOSPITAL | Age: 88
Discharge: HOME | End: 2023-08-12
Attending: STUDENT IN AN ORGANIZED HEALTH CARE EDUCATION/TRAINING PROGRAM
Payer: MEDICARE

## 2023-08-12 VITALS
RESPIRATION RATE: 18 BRPM | HEIGHT: 62 IN | OXYGEN SATURATION: 96 % | DIASTOLIC BLOOD PRESSURE: 73 MMHG | WEIGHT: 130 LBS | BODY MASS INDEX: 23.92 KG/M2 | SYSTOLIC BLOOD PRESSURE: 155 MMHG | HEART RATE: 59 BPM | TEMPERATURE: 97.8 F

## 2023-08-12 DIAGNOSIS — S80.11XA LEG HEMATOMA, RIGHT, INITIAL ENCOUNTER: Primary | ICD-10-CM

## 2023-08-12 LAB — ECHO BSA: 1.61 M2

## 2023-08-12 PROCEDURE — 73590 X-RAY EXAM OF LOWER LEG: CPT

## 2023-08-12 PROCEDURE — 99284 EMERGENCY DEPT VISIT MOD MDM: CPT

## 2023-08-12 PROCEDURE — 93971 EXTREMITY STUDY: CPT

## 2023-08-12 RX ORDER — OMEPRAZOLE 10 MG/1
10 CAPSULE, DELAYED RELEASE ORAL DAILY
COMMUNITY

## 2023-08-12 ASSESSMENT — LIFESTYLE VARIABLES
HOW OFTEN DO YOU HAVE A DRINK CONTAINING ALCOHOL: NEVER
HOW MANY STANDARD DRINKS CONTAINING ALCOHOL DO YOU HAVE ON A TYPICAL DAY: PATIENT DOES NOT DRINK

## 2023-08-12 NOTE — ED NOTES
Pt discharged in stable condition at this time. MD/SUZY reviewed discharge instructions, prescriptions, and follow up with patient at bedside. Pt verbalized understanding and denies any needs or questions at this time.    Pt NAD on DC assisted via WC to be      Thelma De Luna RN  08/12/23 [id number]

## 2023-08-12 NOTE — ED NOTES
Bedside shift change report given to [address] [place] [address] (oncoming nurse) by Chandni Enamorado RN (offgoing nurse). Report included the following information ED SBAR.        Julian Burch RN  08/12/23 1640

## 2023-08-12 NOTE — ED NOTES
Pt educated and demonstrated correct use of incentive spirometry.       Juan Giles RN  08/12/23 3929

## 2023-08-12 NOTE — DISCHARGE INSTRUCTIONS
You presented to the ED with hematoma on her right lower leg with new upper leg swelling and pain over the calf concerning for DVT. DVT study x-ray were unremarkable. No signs of DVT or fractures. Most likely this is pain associated with swelling from the hematoma. Hematoma will continue to resolve over the next few weeks. Follow-up your PCP.

## 2023-08-12 NOTE — ED TRIAGE NOTES
Pt arrives via [facility]. Pt son is present. Pt son states \"she fell Tuesday night and developed a swelling to her right lower leg with bruising. The swelling has gone down a little but the pain is worse. \"

## 2023-08-13 NOTE — ED PROVIDER NOTES
and signs of injury present. Normal range of motion. Cervical back: Normal range of motion. Skin:     Coloration: Skin is not jaundiced. Findings: Erythema present. Comments: Patient is a moderate hematoma on the anterior medial aspect of the right lower leg. Extensive dependent bruising present. Neurological:      General: No focal deficit present. Mental Status: She is alert and oriented to person, place, and time. Psychiatric:         Mood and Affect: Mood normal.         Behavior: Behavior normal.         ED Course:           Imaging Results:  Vascular duplex lower extremity venous right   Final Result      XR TIBIA FIBULA RIGHT (2 VIEWS)   Final Result   No fracture seen   Osteopenia        ED physician interpretation of imaging: Documented in ED course    Medications Given:  Medications - No data to display    Differential Diagnosis included but not limited to: Hematoma, muscle skeletal injury, fracture, DVT    Medical Decision Making  Patient is a 51-year-old female presented ED with right leg swelling and pain after blunt trauma on Tuesday. Swelling has improved since the injury but patient's pain has changed. Based on location increased bili there is concern for DVT. DVT study and x-rays unremarkable. No concerning findings. Most likely patient is experiencing continued pain from the swelling and general trauma of the right lower extremity. No concern for compartment syndrome at this time. Patient stable for discharge home and outpatient symptomatic management. Anticipatory guidance given for duration of bruise/contusion/hematoma resolution. Patient and family comfortable with discharge. Amount and/or Complexity of Data Reviewed  Independent Historian: caregiver and EMS  Radiology: ordered. Procedures       DISPOSITION: Decision To Discharge 08/12/2023 05:51:41 PM    CLINICAL IMPRESSION:   1.  Leg hematoma, right, initial encounter         Further personalized

## 2025-04-30 ENCOUNTER — APPOINTMENT (OUTPATIENT)
Facility: HOSPITAL | Age: 89
End: 2025-04-30
Payer: MEDICARE

## 2025-04-30 ENCOUNTER — HOSPITAL ENCOUNTER (EMERGENCY)
Facility: HOSPITAL | Age: 89
Discharge: HOME | End: 2025-04-30
Attending: EMERGENCY MEDICINE
Payer: MEDICARE

## 2025-04-30 VITALS
HEART RATE: 62 BPM | HEIGHT: 61 IN | DIASTOLIC BLOOD PRESSURE: 88 MMHG | SYSTOLIC BLOOD PRESSURE: 142 MMHG | RESPIRATION RATE: 16 BRPM | BODY MASS INDEX: 22.35 KG/M2 | TEMPERATURE: 98.3 F | OXYGEN SATURATION: 97 % | WEIGHT: 118.39 LBS

## 2025-04-30 DIAGNOSIS — S09.90XA INJURY OF HEAD, INITIAL ENCOUNTER: ICD-10-CM
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
## 2025-04-30 DIAGNOSIS — S01.81XA FACIAL LACERATION, INITIAL ENCOUNTER: ICD-10-CM

## 2025-04-30 DIAGNOSIS — S81.812A NONINFECTED SKIN TEAR OF LEFT LOWER EXTREMITY, INITIAL ENCOUNTER: ICD-10-CM

## 2025-04-30 PROCEDURE — 73030 X-RAY EXAM OF SHOULDER: CPT

## 2025-04-30 PROCEDURE — 90714 TD VACC NO PRESV 7 YRS+ IM: CPT | Performed by: EMERGENCY MEDICINE

## 2025-04-30 PROCEDURE — 99284 EMERGENCY DEPT VISIT MOD MDM: CPT

## 2025-04-30 PROCEDURE — 90471 IMMUNIZATION ADMIN: CPT | Performed by: EMERGENCY MEDICINE

## 2025-04-30 PROCEDURE — 6370000000 HC RX 637 (ALT 250 FOR IP): Performed by: EMERGENCY MEDICINE

## 2025-04-30 PROCEDURE — 73090 X-RAY EXAM OF FOREARM: CPT

## 2025-04-30 PROCEDURE — 70450 CT HEAD/BRAIN W/O DYE: CPT

## 2025-04-30 PROCEDURE — 72125 CT NECK SPINE W/O DYE: CPT

## 2025-04-30 PROCEDURE — 6360000002 HC RX W HCPCS: Performed by: EMERGENCY MEDICINE

## 2025-04-30 RX ORDER — ACETAMINOPHEN 500 MG
1000 TABLET ORAL
Status: COMPLETED | OUTPATIENT
Start: 2025-04-30 | End: 2025-04-30

## 2025-04-30 RX ADMIN — ACETAMINOPHEN 1000 MG: 500 TABLET ORAL at 15:39

## 2025-04-30 RX ADMIN — CLOSTRIDIUM TETANI TOXOID ANTIGEN (FORMALDEHYDE INACTIVATED) AND CORYNEBACTERIUM DIPHTHERIAE TOXOID ANTIGEN (FORMALDEHYDE INACTIVATED) 0.5 ML: 5; 2 INJECTION, SUSPENSION INTRAMUSCULAR at 15:39

## 2025-04-30 ASSESSMENT — PAIN DESCRIPTION - PAIN TYPE: TYPE: ACUTE PAIN

## 2025-04-30 ASSESSMENT — PAIN - FUNCTIONAL ASSESSMENT
PAIN_FUNCTIONAL_ASSESSMENT: 0-10
PAIN_FUNCTIONAL_ASSESSMENT: PREVENTS OR INTERFERES SOME ACTIVE ACTIVITIES AND ADLS

## 2025-04-30 ASSESSMENT — PAIN DESCRIPTION - ORIENTATION: ORIENTATION: LEFT

## 2025-04-30 ASSESSMENT — PAIN DESCRIPTION - DESCRIPTORS: DESCRIPTORS: ACHING

## 2025-04-30 ASSESSMENT — PAIN DESCRIPTION - LOCATION: LOCATION: EYE

## 2025-04-30 NOTE — ED NOTES
PATIENT DISCHARGED IN STABLE CONDITION PER MD ORDER. INSTRUCTIONS AND FOLLOW UP DISCUSSED WITH PATIENT AND SON. PATIENT AND SON VERBALIZED UNDERSTANDING AND HAS NO FURTHER QUESTIONS. AAOX4 NAD NOTED

## 2025-04-30 NOTE — ED PROVIDER NOTES
Appearance: Normal appearance. She is not ill-appearing.   HENT:      Head: Normocephalic.      Comments: Abrasion, contusion, and laceration to the left brow     Mouth/Throat:      Mouth: Mucous membranes are moist.   Eyes:      Extraocular Movements: Extraocular movements intact.      Conjunctiva/sclera: Conjunctivae normal.   Cardiovascular:      Rate and Rhythm: Normal rate and regular rhythm.   Pulmonary:      Effort: Pulmonary effort is normal.   Abdominal:      General: There is no distension.      Palpations: Abdomen is soft.      Tenderness: There is no abdominal tenderness.   Musculoskeletal:      Left shoulder: No deformity, tenderness or bony tenderness. Decreased range of motion.      Left forearm: Tenderness present. No swelling or deformity.      Left wrist: No swelling or tenderness (contusion to the base of the left thumb).      Cervical back: Normal.      Thoracic back: Normal.      Lumbar back: Normal.   Skin:     General: Skin is warm and dry.   Neurological:      General: No focal deficit present.      Mental Status: She is alert and oriented to person, place, and time.   Psychiatric:         Mood and Affect: Mood normal.         Behavior: Behavior normal.         Thought Content: Thought content normal.         Judgment: Judgment normal.         DIAGNOSTIC RESULTS     EKG: All EKG's are interpreted by the Emergency Department Physician who either signs or Co-signs this chart in the absence of a cardiologist.        RADIOLOGY:   Non-plain film images such as CT, Ultrasound and MRI are read by the radiologist. Plain radiographic images are visualized and preliminarily interpreted by the emergency physician with the below findings:        Interpretation per the Radiologist below, if available at the time of this note:    CT HEAD WO CONTRAST   Final Result   No acute intracranial process is identified.    Please see above for additional nonemergent incidental findings.   There is no acute

## 2025-04-30 NOTE — ED TRIAGE NOTES
Pt ambulatory to triage with walker for fall. Pt fell off of curb, pt has laceration above L eye and laceration to L lower leg. Pt reports L sd pain. Pt not on thinners, no LOC.

## 2025-05-09 ENCOUNTER — APPOINTMENT (OUTPATIENT)
Facility: HOSPITAL | Age: 89
End: 2025-05-09
Attending: STUDENT IN AN ORGANIZED HEALTH CARE EDUCATION/TRAINING PROGRAM
Payer: MEDICARE

## 2025-05-09 ENCOUNTER — HOSPITAL ENCOUNTER (EMERGENCY)
Facility: HOSPITAL | Age: 89
Discharge: HOME | End: 2025-05-09
Attending: STUDENT IN AN ORGANIZED HEALTH CARE EDUCATION/TRAINING PROGRAM
Payer: MEDICARE

## 2025-05-09 VITALS
SYSTOLIC BLOOD PRESSURE: 128 MMHG | RESPIRATION RATE: 18 BRPM | BODY MASS INDEX: 22.89 KG/M2 | OXYGEN SATURATION: 98 % | WEIGHT: 121.25 LBS | HEART RATE: 61 BPM | TEMPERATURE: 98.2 F | HEIGHT: 61 IN | DIASTOLIC BLOOD PRESSURE: 77 MMHG

## 2025-05-09 DIAGNOSIS — L03.116 LEFT LEG CELLULITIS: Primary | ICD-10-CM

## 2025-05-09 LAB
ALBUMIN SERPL-MCNC: 3 G/DL (ref 3.5–5)
ALBUMIN/GLOB SERPL: 0.8 (ref 1.1–2.2)
ALP SERPL-CCNC: 177 U/L (ref 45–117)
ALT SERPL-CCNC: 11 U/L (ref 12–78)
ANION GAP BLD CALC-SCNC: 9 (ref 10–20)
ANION GAP SERPL CALC-SCNC: 3 MMOL/L (ref 2–12)
APPEARANCE UR: CLEAR
AST SERPL-CCNC: 12 U/L (ref 15–37)
BACTERIA URNS QL MICRO: NEGATIVE /HPF
BASE EXCESS BLD CALC-SCNC: 5 MMOL/L
BASOPHILS # BLD: 0.04 K/UL (ref 0–0.1)
BASOPHILS NFR BLD: 0.4 % (ref 0–1)
BILIRUB SERPL-MCNC: 0.3 MG/DL (ref 0.2–1)
BILIRUB UR QL: NEGATIVE
BUN SERPL-MCNC: 17 MG/DL (ref 6–20)
BUN/CREAT SERPL: 18 (ref 12–20)
CA-I BLD-MCNC: 1.32 MMOL/L (ref 1.15–1.33)
CALCIUM SERPL-MCNC: 10.1 MG/DL (ref 8.5–10.1)
CHLORIDE BLD-SCNC: 100 MMOL/L (ref 100–111)
CHLORIDE SERPL-SCNC: 102 MMOL/L (ref 97–108)
CO2 BLD-SCNC: 31 MMOL/L (ref 22–29)
CO2 SERPL-SCNC: 32 MMOL/L (ref 21–32)
COLOR UR: NORMAL
COMMENT:: NORMAL
CREAT SERPL-MCNC: 0.95 MG/DL (ref 0.55–1.02)
CREAT UR-MCNC: 0.9 MG/DL (ref 0.6–1.3)
CRP SERPL HS-MCNC: 5.2 MG/L
DIFFERENTIAL METHOD BLD: ABNORMAL
ECHO BSA: 1.54 M2
EKG ATRIAL RATE: 55 BPM
EKG DIAGNOSIS: NORMAL
EKG P AXIS: 80 DEGREES
EKG P-R INTERVAL: 168 MS
EKG Q-T INTERVAL: 454 MS
EKG QRS DURATION: 80 MS
EKG QTC CALCULATION (BAZETT): 434 MS
EKG R AXIS: -4 DEGREES
EKG T AXIS: 37 DEGREES
EKG VENTRICULAR RATE: 55 BPM
EOSINOPHIL # BLD: 0.15 K/UL (ref 0–0.4)
EOSINOPHIL NFR BLD: 1.4 % (ref 0–7)
EPITH CASTS URNS QL MICRO: NORMAL /LPF
ERYTHROCYTE [DISTWIDTH] IN BLOOD BY AUTOMATED COUNT: 12.3 % (ref 11.5–14.5)
ERYTHROCYTE [SEDIMENTATION RATE] IN BLOOD: 10 MM/HR (ref 0–30)
GLOBULIN SER CALC-MCNC: 3.6 G/DL (ref 2–4)
GLUCOSE BLD STRIP.AUTO-MCNC: 121 MG/DL (ref 74–99)
GLUCOSE SERPL-MCNC: 124 MG/DL (ref 65–100)
GLUCOSE UR STRIP.AUTO-MCNC: NEGATIVE MG/DL
HCO3 BLDA-SCNC: 32 MMOL/L
HCT VFR BLD AUTO: 37.8 % (ref 35–47)
HGB BLD-MCNC: 12.5 G/DL (ref 11.5–16)
HGB UR QL STRIP: NEGATIVE
HYALINE CASTS URNS QL MICRO: NORMAL /LPF (ref 0–5)
IMM GRANULOCYTES # BLD AUTO: 0.04 K/UL (ref 0–0.04)
IMM GRANULOCYTES NFR BLD AUTO: 0.4 % (ref 0–0.5)
KETONES UR QL STRIP.AUTO: NEGATIVE MG/DL
LACTATE BLD-SCNC: 1.64 MMOL/L (ref 0.4–2)
LEUKOCYTE ESTERASE UR QL STRIP.AUTO: NEGATIVE
LYMPHOCYTES # BLD: 1.17 K/UL (ref 0.8–3.5)
LYMPHOCYTES NFR BLD: 11.2 % (ref 12–49)
MAGNESIUM SERPL-MCNC: 1.9 MG/DL (ref 1.6–2.4)
MCH RBC QN AUTO: 29.6 PG (ref 26–34)
MCHC RBC AUTO-ENTMCNC: 33.1 G/DL (ref 30–36.5)
MCV RBC AUTO: 89.4 FL (ref 80–99)
MONOCYTES # BLD: 0.93 K/UL (ref 0–1)
MONOCYTES NFR BLD: 8.9 % (ref 5–13)
NEUTS SEG # BLD: 8.09 K/UL (ref 1.8–8)
NEUTS SEG NFR BLD: 77.7 % (ref 32–75)
NITRITE UR QL STRIP.AUTO: NEGATIVE
NRBC # BLD: 0 K/UL (ref 0–0.01)
NRBC BLD-RTO: 0 PER 100 WBC
PCO2 BLDV: 55.7 MMHG (ref 41–51)
PH BLDV: 7.37 (ref 7.32–7.42)
PH UR STRIP: 5.5 (ref 5–8)
PLATELET # BLD AUTO: 266 K/UL (ref 150–400)
PMV BLD AUTO: 9 FL (ref 8.9–12.9)
PO2 BLDV: 38 MMHG (ref 25–40)
POTASSIUM BLD-SCNC: 3.6 MMOL/L (ref 3.5–5.5)
POTASSIUM SERPL-SCNC: 3.6 MMOL/L (ref 3.5–5.1)
PROT SERPL-MCNC: 6.6 G/DL (ref 6.4–8.2)
PROT UR STRIP-MCNC: NEGATIVE MG/DL
RBC # BLD AUTO: 4.23 M/UL (ref 3.8–5.2)
RBC #/AREA URNS HPF: NORMAL /HPF (ref 0–5)
SAO2 % BLD: 68 % (ref 94–98)
SODIUM BLD-SCNC: 140 MMOL/L (ref 136–145)
SODIUM SERPL-SCNC: 137 MMOL/L (ref 136–145)
SP GR UR REFRACTOMETRY: 1.01 (ref 1–1.03)
SPECIMEN HOLD: NORMAL
SPECIMEN HOLD: NORMAL
SPECIMEN SITE: ABNORMAL
UROBILINOGEN UR QL STRIP.AUTO: 0.2 EU/DL (ref 0.2–1)
WBC # BLD AUTO: 10.4 K/UL (ref 3.6–11)
WBC URNS QL MICRO: NORMAL /HPF (ref 0–4)

## 2025-05-09 PROCEDURE — 85025 COMPLETE CBC W/AUTO DIFF WBC: CPT

## 2025-05-09 PROCEDURE — 85652 RBC SED RATE AUTOMATED: CPT

## 2025-05-09 PROCEDURE — 6370000000 HC RX 637 (ALT 250 FOR IP): Performed by: STUDENT IN AN ORGANIZED HEALTH CARE EDUCATION/TRAINING PROGRAM

## 2025-05-09 PROCEDURE — 84132 ASSAY OF SERUM POTASSIUM: CPT

## 2025-05-09 PROCEDURE — 86141 C-REACTIVE PROTEIN HS: CPT

## 2025-05-09 PROCEDURE — 80053 COMPREHEN METABOLIC PANEL: CPT

## 2025-05-09 PROCEDURE — 93971 EXTREMITY STUDY: CPT

## 2025-05-09 PROCEDURE — 83735 ASSAY OF MAGNESIUM: CPT

## 2025-05-09 PROCEDURE — 82803 BLOOD GASES ANY COMBINATION: CPT

## 2025-05-09 PROCEDURE — 82947 ASSAY GLUCOSE BLOOD QUANT: CPT

## 2025-05-09 PROCEDURE — 82330 ASSAY OF CALCIUM: CPT

## 2025-05-09 PROCEDURE — 81001 URINALYSIS AUTO W/SCOPE: CPT

## 2025-05-09 PROCEDURE — 93005 ELECTROCARDIOGRAM TRACING: CPT | Performed by: STUDENT IN AN ORGANIZED HEALTH CARE EDUCATION/TRAINING PROGRAM

## 2025-05-09 PROCEDURE — 84295 ASSAY OF SERUM SODIUM: CPT

## 2025-05-09 PROCEDURE — 99284 EMERGENCY DEPT VISIT MOD MDM: CPT

## 2025-05-09 RX ORDER — CEFUROXIME AXETIL 500 MG/1
500 TABLET ORAL 2 TIMES DAILY
Qty: 14 TABLET | Refills: 0 | Status: SHIPPED | OUTPATIENT
Start: 2025-05-09 | End: 2025-05-16

## 2025-05-09 RX ORDER — CEFUROXIME AXETIL 250 MG/1
500 TABLET ORAL ONCE
Status: COMPLETED | OUTPATIENT
Start: 2025-05-09 | End: 2025-05-09

## 2025-05-09 RX ADMIN — CEFUROXIME AXETIL 500 MG: 250 TABLET, FILM COATED ORAL at 15:01

## 2025-05-09 ASSESSMENT — PAIN - FUNCTIONAL ASSESSMENT
PAIN_FUNCTIONAL_ASSESSMENT: ACTIVITIES ARE NOT PREVENTED
PAIN_FUNCTIONAL_ASSESSMENT: NONE - DENIES PAIN
PAIN_FUNCTIONAL_ASSESSMENT: 0-10

## 2025-05-09 ASSESSMENT — PAIN DESCRIPTION - DESCRIPTORS: DESCRIPTORS: ACHING

## 2025-05-09 ASSESSMENT — PAIN DESCRIPTION - ORIENTATION: ORIENTATION: LEFT;RIGHT

## 2025-05-09 ASSESSMENT — PAIN SCALES - GENERAL
PAINLEVEL_OUTOF10: 0
PAINLEVEL_OUTOF10: 7

## 2025-05-09 ASSESSMENT — PAIN DESCRIPTION - LOCATION: LOCATION: LEG

## 2025-05-09 ASSESSMENT — PAIN DESCRIPTION - PAIN TYPE: TYPE: ACUTE PAIN

## 2025-05-09 ASSESSMENT — PAIN DESCRIPTION - FREQUENCY: FREQUENCY: CONTINUOUS

## 2025-05-09 ASSESSMENT — PAIN DESCRIPTION - DIRECTION: RADIATING_TOWARDS: DOWN LEG

## 2025-05-09 ASSESSMENT — PAIN DESCRIPTION - ONSET: ONSET: ON-GOING

## 2025-05-09 NOTE — ED TRIAGE NOTES
Pt arrives with EMS from [facility] for bilateral leg weakness with left leg pain and wound to left lower leg. Pt on oral abx for wound s/p GLF on 4/30/25.     Bruising and sutures to left side of face

## 2025-05-11 NOTE — ED PROVIDER NOTES
Patricia, POC 55.7 (*)     POC O2 SAT 68 (*)     POC TCO2 31 (*)     Anion Gap, POC 9 (*)     POC Glucose 121 (*)     eGFR, POC 59 (*)     All other components within normal limits   URINE CULTURE HOLD SAMPLE   HIGH SENSITIVITY CRP   SEDIMENTATION RATE   URINALYSIS WITH MICROSCOPIC   MAGNESIUM   EXTRA TUBES HOLD   POCT LACTIC ACID       All other labs were within normal range or not returned as of this dictation.        PROCEDURES:  Unless otherwise noted below, none     Procedures    See MDM for documentation of critical care time.       FINAL IMPRESSION      1. Left leg cellulitis          DISPOSITION/PLAN   DISPOSITION Decision To Discharge 05/09/2025 03:30:24 PM      PATIENT REFERRED TO:  Jabier Anderson MD  [address] [id number] [phone]    Call in 1 day  Regarding your ER visit today and for wound [facility]    Emergency Department    Go to   If symptoms worsen      DISCHARGE MEDICATIONS:  Discharge Medication List as of 5/9/2025  2:57 PM        START taking these medications    Details   cefUROXime (CEFTIN) 500 MG tablet Take 1 tablet by mouth 2 times daily for 7 days, Disp-14 tablet, R-0Normal               (Please note that portions of this note were completed with a voice recognition program.  Efforts were made to edit the dictations but occasionally words are mis-transcribed.)    Xuan Mathew DO (electronically signed)  Emergency Attending Physician / Physician Assistant / Nurse Practitioner            Xuan Mathew DO  05/15/25 0957

## (undated) DEVICE — Device

## (undated) DEVICE — KIT IV STRT W CHLORAPREP PD 1ML

## (undated) DEVICE — SET ADMIN 16ML TBNG L100IN 2 Y INJ SITE IV PIGGY BK DISP

## (undated) DEVICE — 1200 GUARD II KIT W/5MM TUBE W/O VAC TUBE: Brand: GUARDIAN

## (undated) DEVICE — CANN NASAL O2 CAPNOGRAPHY AD -- FILTERLINE

## (undated) DEVICE — NEONATAL-ADULT SPO2 SENSOR: Brand: NELLCOR

## (undated) DEVICE — SOLIDIFIER FLUID 3000 CC ABSORB

## (undated) DEVICE — WRISTBAND ID AD W1XL11.5IN RED POLY ALRG PREPRINTED PERM

## (undated) DEVICE — BITE BLK ENDOSCP AD 54FR GRN POLYETH ENDOSCP W STRP SLD

## (undated) DEVICE — ENDO CARRY-ON PROCEDURE KIT INCLUDES ENZYMATIC SPONGE, GAUZE, BIOHAZARD LABEL, TRAY, LUBRICANT, DIRTY SCOPE LABEL, WATER LABEL, TRAY, DRAWSTRING PAD, AND DEFENDO 4-PIECE KIT.: Brand: ENDO CARRY-ON PROCEDURE KIT

## (undated) DEVICE — BAG BELONG PT PERS CLEAR HANDL

## (undated) DEVICE — BW-412T DISP COMBO CLEANING BRUSH: Brand: SINGLE USE COMBINATION CLEANING BRUSH

## (undated) DEVICE — Z DISCONTINUED NO SUB IDED SET EXTN W/ 4 W STPCOCK M SPIN LOK 36IN

## (undated) DEVICE — NEEDLE HYPO 18GA L1.5IN PNK S STL HUB POLYPR SHLD REG BVL

## (undated) DEVICE — CATH IV AUTOGRD BC BLU 22GA 25 -- INSYTE

## (undated) DEVICE — KENDALL RADIOLUCENT FOAM MONITORING ELECTRODE -RECTANGULAR SHAPE: Brand: KENDALL

## (undated) DEVICE — SYRINGE MED 20ML STD CLR PLAS LUERLOCK TIP N CTRL DISP